# Patient Record
Sex: MALE | Race: WHITE | NOT HISPANIC OR LATINO | URBAN - METROPOLITAN AREA
[De-identification: names, ages, dates, MRNs, and addresses within clinical notes are randomized per-mention and may not be internally consistent; named-entity substitution may affect disease eponyms.]

---

## 2017-02-12 ENCOUNTER — EMERGENCY (EMERGENCY)
Facility: HOSPITAL | Age: 82
LOS: 1 days | End: 2017-02-12
Admitting: INTERNAL MEDICINE
Payer: MEDICARE

## 2017-02-12 DIAGNOSIS — Z95.5 PRESENCE OF CORONARY ANGIOPLASTY IMPLANT AND GRAFT: Chronic | ICD-10-CM

## 2017-02-12 DIAGNOSIS — Z95.1 PRESENCE OF AORTOCORONARY BYPASS GRAFT: Chronic | ICD-10-CM

## 2017-02-12 DIAGNOSIS — Z98.89 OTHER SPECIFIED POSTPROCEDURAL STATES: Chronic | ICD-10-CM

## 2017-02-12 PROCEDURE — 70450 CT HEAD/BRAIN W/O DYE: CPT | Mod: 26

## 2017-02-12 PROCEDURE — 70450 CT HEAD/BRAIN W/O DYE: CPT

## 2017-02-12 PROCEDURE — 99284 EMERGENCY DEPT VISIT MOD MDM: CPT | Mod: 25

## 2017-02-12 PROCEDURE — 99284 EMERGENCY DEPT VISIT MOD MDM: CPT

## 2017-08-14 ENCOUNTER — INPATIENT (INPATIENT)
Facility: HOSPITAL | Age: 82
LOS: 3 days | DRG: 189 | End: 2017-08-18
Attending: INTERNAL MEDICINE | Admitting: INTERNAL MEDICINE
Payer: MEDICARE

## 2017-08-14 VITALS — HEART RATE: 88 BPM | RESPIRATION RATE: 20 BRPM | SYSTOLIC BLOOD PRESSURE: 112 MMHG | DIASTOLIC BLOOD PRESSURE: 74 MMHG

## 2017-08-14 DIAGNOSIS — Z71.89 OTHER SPECIFIED COUNSELING: ICD-10-CM

## 2017-08-14 DIAGNOSIS — N39.0 URINARY TRACT INFECTION, SITE NOT SPECIFIED: ICD-10-CM

## 2017-08-14 DIAGNOSIS — Z95.5 PRESENCE OF CORONARY ANGIOPLASTY IMPLANT AND GRAFT: ICD-10-CM

## 2017-08-14 DIAGNOSIS — N17.9 ACUTE KIDNEY FAILURE, UNSPECIFIED: ICD-10-CM

## 2017-08-14 DIAGNOSIS — R06.00 DYSPNEA, UNSPECIFIED: ICD-10-CM

## 2017-08-14 DIAGNOSIS — R11.2 NAUSEA WITH VOMITING, UNSPECIFIED: ICD-10-CM

## 2017-08-14 DIAGNOSIS — I25.10 ATHEROSCLEROTIC HEART DISEASE OF NATIVE CORONARY ARTERY WITHOUT ANGINA PECTORIS: ICD-10-CM

## 2017-08-14 DIAGNOSIS — Z98.89 OTHER SPECIFIED POSTPROCEDURAL STATES: Chronic | ICD-10-CM

## 2017-08-14 DIAGNOSIS — I10 ESSENTIAL (PRIMARY) HYPERTENSION: ICD-10-CM

## 2017-08-14 DIAGNOSIS — K21.9 GASTRO-ESOPHAGEAL REFLUX DISEASE WITHOUT ESOPHAGITIS: ICD-10-CM

## 2017-08-14 DIAGNOSIS — R11.10 VOMITING, UNSPECIFIED: ICD-10-CM

## 2017-08-14 DIAGNOSIS — Z95.1 PRESENCE OF AORTOCORONARY BYPASS GRAFT: Chronic | ICD-10-CM

## 2017-08-14 DIAGNOSIS — F32.9 MAJOR DEPRESSIVE DISORDER, SINGLE EPISODE, UNSPECIFIED: ICD-10-CM

## 2017-08-14 DIAGNOSIS — Z95.5 PRESENCE OF CORONARY ANGIOPLASTY IMPLANT AND GRAFT: Chronic | ICD-10-CM

## 2017-08-14 DIAGNOSIS — E78.5 HYPERLIPIDEMIA, UNSPECIFIED: ICD-10-CM

## 2017-08-14 DIAGNOSIS — A41.9 SEPSIS, UNSPECIFIED ORGANISM: ICD-10-CM

## 2017-08-14 LAB
ALBUMIN SERPL ELPH-MCNC: 2.8 G/DL — LOW (ref 3.3–5)
ALP SERPL-CCNC: 49 U/L — SIGNIFICANT CHANGE UP (ref 40–120)
ALT FLD-CCNC: 16 U/L — SIGNIFICANT CHANGE UP (ref 12–78)
ANION GAP SERPL CALC-SCNC: 11 MMOL/L — SIGNIFICANT CHANGE UP (ref 5–17)
APTT BLD: 25.1 SEC — LOW (ref 27.5–37.4)
AST SERPL-CCNC: 21 U/L — SIGNIFICANT CHANGE UP (ref 15–37)
BASE EXCESS BLDA CALC-SCNC: -3.4 MMOL/L — LOW (ref -2–2)
BASOPHILS # BLD AUTO: 0.1 K/UL — SIGNIFICANT CHANGE UP (ref 0–0.2)
BASOPHILS NFR BLD AUTO: 0.9 % — SIGNIFICANT CHANGE UP (ref 0–2)
BILIRUB SERPL-MCNC: 0.4 MG/DL — SIGNIFICANT CHANGE UP (ref 0.2–1.2)
BLOOD GAS COMMENTS ARTERIAL: SIGNIFICANT CHANGE UP
BUN SERPL-MCNC: 42 MG/DL — HIGH (ref 7–23)
CALCIUM SERPL-MCNC: 8.1 MG/DL — LOW (ref 8.5–10.1)
CHLORIDE SERPL-SCNC: 109 MMOL/L — HIGH (ref 96–108)
CO2 SERPL-SCNC: 23 MMOL/L — SIGNIFICANT CHANGE UP (ref 22–31)
CREAT SERPL-MCNC: 1.8 MG/DL — HIGH (ref 0.5–1.3)
EOSINOPHIL # BLD AUTO: 0.1 K/UL — SIGNIFICANT CHANGE UP (ref 0–0.5)
EOSINOPHIL NFR BLD AUTO: 0.6 % — SIGNIFICANT CHANGE UP (ref 0–6)
GLUCOSE SERPL-MCNC: 157 MG/DL — HIGH (ref 70–99)
HCO3 BLDA-SCNC: 22 MMOL/L — LOW (ref 23–27)
HCT VFR BLD CALC: 34 % — LOW (ref 39–50)
HGB BLD-MCNC: 11.1 G/DL — LOW (ref 13–17)
HOROWITZ INDEX BLDA+IHG-RTO: 36 — SIGNIFICANT CHANGE UP
INR BLD: 1.21 RATIO — HIGH (ref 0.88–1.16)
LACTATE SERPL-SCNC: 2.9 MMOL/L — HIGH (ref 0.7–2)
LACTATE SERPL-SCNC: 3 MMOL/L — HIGH (ref 0.7–2)
LYMPHOCYTES # BLD AUTO: 2.8 K/UL — SIGNIFICANT CHANGE UP (ref 1–3.3)
LYMPHOCYTES # BLD AUTO: 21.1 % — SIGNIFICANT CHANGE UP (ref 13–44)
MCHC RBC-ENTMCNC: 29.5 PG — SIGNIFICANT CHANGE UP (ref 27–34)
MCHC RBC-ENTMCNC: 32.6 GM/DL — SIGNIFICANT CHANGE UP (ref 32–36)
MCV RBC AUTO: 90.6 FL — SIGNIFICANT CHANGE UP (ref 80–100)
MONOCYTES # BLD AUTO: 0.6 K/UL — SIGNIFICANT CHANGE UP (ref 0–0.9)
MONOCYTES NFR BLD AUTO: 4.5 % — SIGNIFICANT CHANGE UP (ref 1–9)
NEUTROPHILS # BLD AUTO: 9.5 K/UL — HIGH (ref 1.8–7.4)
NEUTROPHILS NFR BLD AUTO: 72.9 % — SIGNIFICANT CHANGE UP (ref 43–77)
NT-PROBNP SERPL-SCNC: 666 PG/ML — HIGH (ref 0–450)
PCO2 BLDA: 36 MMHG — SIGNIFICANT CHANGE UP (ref 32–46)
PH BLDA: 7.38 — SIGNIFICANT CHANGE UP (ref 7.35–7.45)
PLATELET # BLD AUTO: 227 K/UL — SIGNIFICANT CHANGE UP (ref 150–400)
PO2 BLDA: 54 MMHG — LOW (ref 74–108)
POTASSIUM SERPL-MCNC: 3.9 MMOL/L — SIGNIFICANT CHANGE UP (ref 3.5–5.3)
POTASSIUM SERPL-SCNC: 3.9 MMOL/L — SIGNIFICANT CHANGE UP (ref 3.5–5.3)
PROT SERPL-MCNC: 6.4 G/DL — SIGNIFICANT CHANGE UP (ref 6–8.3)
PROTHROM AB SERPL-ACNC: 13.2 SEC — HIGH (ref 9.8–12.7)
RBC # BLD: 3.75 M/UL — LOW (ref 4.2–5.8)
RBC # FLD: 16.1 % — HIGH (ref 10.3–14.5)
SAO2 % BLDA: 85 % — LOW (ref 92–96)
SODIUM SERPL-SCNC: 143 MMOL/L — SIGNIFICANT CHANGE UP (ref 135–145)
TROPONIN I SERPL-MCNC: 0.02 NG/ML — SIGNIFICANT CHANGE UP (ref 0.01–0.04)
WBC # BLD: 13.1 K/UL — HIGH (ref 3.8–10.5)
WBC # FLD AUTO: 13.1 K/UL — HIGH (ref 3.8–10.5)

## 2017-08-14 PROCEDURE — 93010 ELECTROCARDIOGRAM REPORT: CPT

## 2017-08-14 PROCEDURE — 78582 LUNG VENTILAT&PERFUS IMAGING: CPT | Mod: 26

## 2017-08-14 PROCEDURE — 99285 EMERGENCY DEPT VISIT HI MDM: CPT

## 2017-08-14 PROCEDURE — 71010: CPT | Mod: 26

## 2017-08-14 PROCEDURE — 74020: CPT | Mod: 26

## 2017-08-14 RX ORDER — MEGESTROL ACETATE 40 MG/ML
625 SUSPENSION ORAL AT BEDTIME
Qty: 0 | Refills: 0 | Status: DISCONTINUED | OUTPATIENT
Start: 2017-08-14 | End: 2017-08-14

## 2017-08-14 RX ORDER — DOCUSATE SODIUM 100 MG
100 CAPSULE ORAL
Qty: 0 | Refills: 0 | Status: DISCONTINUED | OUTPATIENT
Start: 2017-08-14 | End: 2017-08-14

## 2017-08-14 RX ORDER — MORPHINE SULFATE 50 MG/1
2 CAPSULE, EXTENDED RELEASE ORAL
Qty: 0 | Refills: 0 | Status: DISCONTINUED | OUTPATIENT
Start: 2017-08-14 | End: 2017-08-18

## 2017-08-14 RX ORDER — CEFTRIAXONE 500 MG/1
INJECTION, POWDER, FOR SOLUTION INTRAMUSCULAR; INTRAVENOUS
Qty: 0 | Refills: 0 | Status: DISCONTINUED | OUTPATIENT
Start: 2017-08-14 | End: 2017-08-14

## 2017-08-14 RX ORDER — HYDROMORPHONE HYDROCHLORIDE 2 MG/ML
1 INJECTION INTRAMUSCULAR; INTRAVENOUS; SUBCUTANEOUS
Qty: 0 | Refills: 0 | Status: DISCONTINUED | OUTPATIENT
Start: 2017-08-14 | End: 2017-08-14

## 2017-08-14 RX ORDER — SODIUM CHLORIDE 9 MG/ML
3 INJECTION INTRAMUSCULAR; INTRAVENOUS; SUBCUTANEOUS ONCE
Qty: 0 | Refills: 0 | Status: COMPLETED | OUTPATIENT
Start: 2017-08-14 | End: 2017-08-14

## 2017-08-14 RX ORDER — MORPHINE SULFATE 50 MG/1
2 CAPSULE, EXTENDED RELEASE ORAL ONCE
Qty: 0 | Refills: 0 | Status: DISCONTINUED | OUTPATIENT
Start: 2017-08-14 | End: 2017-08-14

## 2017-08-14 RX ORDER — PANTOPRAZOLE SODIUM 20 MG/1
40 TABLET, DELAYED RELEASE ORAL
Qty: 0 | Refills: 0 | Status: DISCONTINUED | OUTPATIENT
Start: 2017-08-14 | End: 2017-08-14

## 2017-08-14 RX ORDER — METOPROLOL TARTRATE 50 MG
25 TABLET ORAL DAILY
Qty: 0 | Refills: 0 | Status: DISCONTINUED | OUTPATIENT
Start: 2017-08-14 | End: 2017-08-14

## 2017-08-14 RX ORDER — SODIUM CHLORIDE 9 MG/ML
1000 INJECTION INTRAMUSCULAR; INTRAVENOUS; SUBCUTANEOUS ONCE
Qty: 0 | Refills: 0 | Status: COMPLETED | OUTPATIENT
Start: 2017-08-14 | End: 2017-08-14

## 2017-08-14 RX ORDER — FINASTERIDE 5 MG/1
5 TABLET, FILM COATED ORAL DAILY
Qty: 0 | Refills: 0 | Status: DISCONTINUED | OUTPATIENT
Start: 2017-08-14 | End: 2017-08-14

## 2017-08-14 RX ORDER — TICAGRELOR 90 MG/1
90 TABLET ORAL
Qty: 0 | Refills: 0 | Status: DISCONTINUED | OUTPATIENT
Start: 2017-08-14 | End: 2017-08-14

## 2017-08-14 RX ORDER — MEGESTROL ACETATE 40 MG/ML
800 SUSPENSION ORAL AT BEDTIME
Qty: 0 | Refills: 0 | Status: DISCONTINUED | OUTPATIENT
Start: 2017-08-14 | End: 2017-08-14

## 2017-08-14 RX ORDER — TAMSULOSIN HYDROCHLORIDE 0.4 MG/1
0.8 CAPSULE ORAL AT BEDTIME
Qty: 0 | Refills: 0 | Status: DISCONTINUED | OUTPATIENT
Start: 2017-08-14 | End: 2017-08-14

## 2017-08-14 RX ORDER — SODIUM CHLORIDE 9 MG/ML
1000 INJECTION INTRAMUSCULAR; INTRAVENOUS; SUBCUTANEOUS ONCE
Qty: 0 | Refills: 0 | Status: DISCONTINUED | OUTPATIENT
Start: 2017-08-14 | End: 2017-08-14

## 2017-08-14 RX ORDER — ACETAMINOPHEN 500 MG
650 TABLET ORAL EVERY 6 HOURS
Qty: 0 | Refills: 0 | Status: DISCONTINUED | OUTPATIENT
Start: 2017-08-14 | End: 2017-08-18

## 2017-08-14 RX ORDER — PREGABALIN 225 MG/1
1000 CAPSULE ORAL DAILY
Qty: 0 | Refills: 0 | Status: DISCONTINUED | OUTPATIENT
Start: 2017-08-14 | End: 2017-08-14

## 2017-08-14 RX ORDER — CEFTRIAXONE 500 MG/1
1 INJECTION, POWDER, FOR SOLUTION INTRAMUSCULAR; INTRAVENOUS ONCE
Qty: 0 | Refills: 0 | Status: COMPLETED | OUTPATIENT
Start: 2017-08-14 | End: 2017-08-14

## 2017-08-14 RX ORDER — FLUOXETINE HCL 10 MG
60 CAPSULE ORAL AT BEDTIME
Qty: 0 | Refills: 0 | Status: DISCONTINUED | OUTPATIENT
Start: 2017-08-14 | End: 2017-08-14

## 2017-08-14 RX ORDER — ATROPINE SULFATE 1 %
4 DROPS OPHTHALMIC (EYE) EVERY 6 HOURS
Qty: 0 | Refills: 0 | Status: DISCONTINUED | OUTPATIENT
Start: 2017-08-14 | End: 2017-08-18

## 2017-08-14 RX ORDER — ASPIRIN/CALCIUM CARB/MAGNESIUM 324 MG
81 TABLET ORAL DAILY
Qty: 0 | Refills: 0 | Status: DISCONTINUED | OUTPATIENT
Start: 2017-08-14 | End: 2017-08-14

## 2017-08-14 RX ORDER — SUCRALFATE 1 G
1 TABLET ORAL EVERY 6 HOURS
Qty: 0 | Refills: 0 | Status: DISCONTINUED | OUTPATIENT
Start: 2017-08-14 | End: 2017-08-14

## 2017-08-14 RX ORDER — ONDANSETRON 8 MG/1
4 TABLET, FILM COATED ORAL EVERY 6 HOURS
Qty: 0 | Refills: 0 | Status: DISCONTINUED | OUTPATIENT
Start: 2017-08-14 | End: 2017-08-18

## 2017-08-14 RX ORDER — SODIUM CHLORIDE 9 MG/ML
1000 INJECTION INTRAMUSCULAR; INTRAVENOUS; SUBCUTANEOUS
Qty: 0 | Refills: 0 | Status: DISCONTINUED | OUTPATIENT
Start: 2017-08-14 | End: 2017-08-15

## 2017-08-14 RX ORDER — ALBUTEROL 90 UG/1
2.5 AEROSOL, METERED ORAL ONCE
Qty: 0 | Refills: 0 | Status: DISCONTINUED | OUTPATIENT
Start: 2017-08-14 | End: 2017-08-14

## 2017-08-14 RX ORDER — ATORVASTATIN CALCIUM 80 MG/1
40 TABLET, FILM COATED ORAL AT BEDTIME
Qty: 0 | Refills: 0 | Status: DISCONTINUED | OUTPATIENT
Start: 2017-08-14 | End: 2017-08-14

## 2017-08-14 RX ADMIN — CEFTRIAXONE 100 GRAM(S): 500 INJECTION, POWDER, FOR SOLUTION INTRAMUSCULAR; INTRAVENOUS at 14:47

## 2017-08-14 RX ADMIN — SODIUM CHLORIDE 1000 MILLILITER(S): 9 INJECTION INTRAMUSCULAR; INTRAVENOUS; SUBCUTANEOUS at 10:25

## 2017-08-14 RX ADMIN — MORPHINE SULFATE 2 MILLIGRAM(S): 50 CAPSULE, EXTENDED RELEASE ORAL at 20:24

## 2017-08-14 RX ADMIN — SODIUM CHLORIDE 60 MILLILITER(S): 9 INJECTION INTRAMUSCULAR; INTRAVENOUS; SUBCUTANEOUS at 23:28

## 2017-08-14 RX ADMIN — SODIUM CHLORIDE 2000 MILLILITER(S): 9 INJECTION INTRAMUSCULAR; INTRAVENOUS; SUBCUTANEOUS at 11:30

## 2017-08-14 RX ADMIN — SODIUM CHLORIDE 3 MILLILITER(S): 9 INJECTION INTRAMUSCULAR; INTRAVENOUS; SUBCUTANEOUS at 09:25

## 2017-08-14 RX ADMIN — MORPHINE SULFATE 2 MILLIGRAM(S): 50 CAPSULE, EXTENDED RELEASE ORAL at 19:45

## 2017-08-14 RX ADMIN — MORPHINE SULFATE 2 MILLIGRAM(S): 50 CAPSULE, EXTENDED RELEASE ORAL at 23:28

## 2017-08-14 RX ADMIN — MORPHINE SULFATE 2 MILLIGRAM(S): 50 CAPSULE, EXTENDED RELEASE ORAL at 16:19

## 2017-08-14 RX ADMIN — HYDROMORPHONE HYDROCHLORIDE 1 MILLIGRAM(S): 2 INJECTION INTRAMUSCULAR; INTRAVENOUS; SUBCUTANEOUS at 16:33

## 2017-08-14 NOTE — CHART NOTE - NSCHARTNOTEFT_GEN_A_CORE
Patient is a 88y old  Male who presents with a chief complaint of vomitting and unresponsiveness (14 Aug 2017 13:13)      Rapid response was called on this 87yo  Male patient for dyspnea.  Pt was seen and examined in ED with difficulty breathing.  Pt appeared confused and unable to find a position of comfort with breathing.  Pt was put on venturi mask 100% oxygen.  ICU team was present and Dr. Echevarria spoke with daughter who is healthcare proxy and she has agreed for comfort care.  Pt was given 2mg Morphine IV for dyspnea.  Symptoms mildly improved.    Patient was seen and examined at the bedside by the rapid response team.    ROS unable to provide due to dyspnea and incomplete responses.    PAST MEDICAL & SURGICAL HISTORY:  Stented coronary artery: x 3, most recent stent 6 months ago  AAA (abdominal aortic aneurysm)  GERD (gastroesophageal reflux disease)  HLD (hyperlipidemia)  HTN (hypertension)  CAD (coronary artery disease)  History of heart artery stent      MEDICATIONS  (STANDING):  finasteride 5 milliGRAM(s) Oral daily  aspirin enteric coated 81 milliGRAM(s) Oral daily  tamsulosin 0.8 milliGRAM(s) Oral at bedtime  FLUoxetine 60 milliGRAM(s) Oral at bedtime  atorvastatin 40 milliGRAM(s) Oral at bedtime  ticagrelor 90 milliGRAM(s) Oral two times a day  metoprolol succinate ER 25 milliGRAM(s) Oral daily  docusate sodium 100 milliGRAM(s) Oral two times a day  pantoprazole    Tablet 40 milliGRAM(s) Oral before breakfast  cyanocobalamin 1000 MICROGram(s) Oral daily  sodium chloride 0.9%. 1000 milliLiter(s) (60 mL/Hr) IV Continuous <Continuous>  cefTRIAXone   IVPB   IV Intermittent   megestrol Suspension 800 milliGRAM(s) Oral at bedtime  ALBUTerol    0.083%. 2.5 milliGRAM(s) Nebulizer once  sucralfate 1 Gram(s) Oral every 6 hours  morphine  - Injectable 2 milliGRAM(s) IV Push once    MEDICATIONS  (PRN):  acetaminophen   Tablet 650 milliGRAM(s) Oral every 6 hours PRN Mild Pain  ondansetron Injectable 4 milliGRAM(s) IV Push every 6 hours PRN Nausea and/or Vomiting  HYDROmorphone  Injectable 1 milliGRAM(s) IV Push every 2 hours PRN SOB      Allergies    No Known Allergies    Intolerances        Vital Signs Last 24 Hrs  BP: 132/60 HR: 127 RR: 48 Temp 97.3 SPO2: 84%    PHYSICAL EXAM:  GENERAL: Pt in severe respiratory distress, agitated, confused  HEAD:  Atraumatic, Normocephalic  EYES: PERRLA, conjunctiva and sclera clear  NECK: Supple, No JVD  NERVOUS SYSTEM:  Awake, Alert & Oriented X1 to person, moving all extremities  CHEST/LUNG: coarse breath sounds throughout  HEART: Tachycardic, S1 S2   ABDOMEN: Soft, Distended, nontender; Bowel sounds present  EXTREMITIES:  2+ Peripheral Pulses, No clubbing, cyanosis, or edema, right knee abrasion  LYMPH: No lymphadenopathy noted  SKIN: No rashes or lesions    LABS:                        11.1   13.1  )-----------( 227      ( 14 Aug 2017 09:46 )             34.0     14 Aug 2017 09:46    143    |  109    |  42     ----------------------------<  157    3.9     |  23     |  1.80     Ca    8.1        14 Aug 2017 09:46    TPro  6.4    /  Alb  2.8    /  TBili  0.4    /  DBili  x      /  AST  21     /  ALT  16     /  AlkPhos  49     14 Aug 2017 09:46    PT/INR - ( 14 Aug 2017 09:46 )   PT: 13.2 sec;   INR: 1.21 ratio         PTT - ( 14 Aug 2017 09:46 )  PTT:25.1 sec    CAPILLARY BLOOD GLUCOSE        ABG - ( 14 Aug 2017 09:52 )  pH: 7.38  /  pCO2: 36    /  pO2: 54    / HCO3: 22    / Base Excess: -3.4  /  SaO2: 85          ASSESSMENT/PLAN  87yo M with PMHx of CAD s/p cardiac stents, HTN, HLD presents for acute dyspnea.    Dyspnea  -100% O2 on venturi mask  -Morphine 2mg IV STAT x1 dose   -Dilaudid 1mg IV q2 PRN  -Monitor SPO2  -ICU Attending Dr. Echevarria notified PMD Dr. Segal  -ICU Attending Dr. Echevarria spoke with family and they agree with comfort care'  -Pt is DNR                RADIOLOGY & ADDITIONAL TESTS:    Imaging Personally Reviewed:  [ ] YES  [ ] NO    Consultant(s) Notes Reviewed:  [ ] YES  [ ] NO    Care Discussed with Consultants/Other Providers [ ] YES  [ ] NO    Critical care time spent at bedside and coordinating care for patient: Patient is a 88y old  Male who presents with a chief complaint of vomitting and unresponsiveness (14 Aug 2017 13:13)      Rapid response was called on this 87yo  Male patient for dyspnea.  Pt was seen and examined in ED with difficulty breathing.  Pt appeared confused and unable to find a position of comfort with breathing.  Pt was put on venturi mask 100% oxygen.  ICU team was present and Dr. Echevarria spoke with daughter who is healthcare proxy and she has agreed for comfort care.  Pt was given 2mg Morphine IV for dyspnea.  Symptoms mildly improved.    Patient was seen and examined at the bedside by the rapid response team.    ROS unable to provide due to dyspnea and incomplete responses.    PAST MEDICAL & SURGICAL HISTORY:  Stented coronary artery: x 3, most recent stent 6 months ago  AAA (abdominal aortic aneurysm)  GERD (gastroesophageal reflux disease)  HLD (hyperlipidemia)  HTN (hypertension)  CAD (coronary artery disease)  History of heart artery stent      MEDICATIONS  (STANDING):  finasteride 5 milliGRAM(s) Oral daily  aspirin enteric coated 81 milliGRAM(s) Oral daily  tamsulosin 0.8 milliGRAM(s) Oral at bedtime  FLUoxetine 60 milliGRAM(s) Oral at bedtime  atorvastatin 40 milliGRAM(s) Oral at bedtime  ticagrelor 90 milliGRAM(s) Oral two times a day  metoprolol succinate ER 25 milliGRAM(s) Oral daily  docusate sodium 100 milliGRAM(s) Oral two times a day  pantoprazole    Tablet 40 milliGRAM(s) Oral before breakfast  cyanocobalamin 1000 MICROGram(s) Oral daily  sodium chloride 0.9%. 1000 milliLiter(s) (60 mL/Hr) IV Continuous <Continuous>  cefTRIAXone   IVPB   IV Intermittent   megestrol Suspension 800 milliGRAM(s) Oral at bedtime  ALBUTerol    0.083%. 2.5 milliGRAM(s) Nebulizer once  sucralfate 1 Gram(s) Oral every 6 hours  morphine  - Injectable 2 milliGRAM(s) IV Push once    MEDICATIONS  (PRN):  acetaminophen   Tablet 650 milliGRAM(s) Oral every 6 hours PRN Mild Pain  ondansetron Injectable 4 milliGRAM(s) IV Push every 6 hours PRN Nausea and/or Vomiting  HYDROmorphone  Injectable 1 milliGRAM(s) IV Push every 2 hours PRN SOB      Allergies    No Known Allergies    Intolerances        Vital Signs Last 24 Hrs  BP: 132/60 HR: 127 RR: 48 Temp 97.3 SPO2: 84%    PHYSICAL EXAM:  GENERAL: Pt in severe respiratory distress, agitated, confused  HEAD:  Atraumatic, Normocephalic  EYES: PERRLA, conjunctiva and sclera clear  NECK: Supple, No JVD  NERVOUS SYSTEM:  Awake, Alert & Oriented X1 to person, moving all extremities  CHEST/LUNG: coarse breath sounds throughout  HEART: Tachycardic, S1 S2   ABDOMEN: Soft, Distended, nontender; Bowel sounds present  EXTREMITIES:  2+ Peripheral Pulses, No clubbing, cyanosis, or edema, right knee abrasion  LYMPH: No lymphadenopathy noted  SKIN: No rashes or lesions    LABS:                        11.1   13.1  )-----------( 227      ( 14 Aug 2017 09:46 )             34.0     14 Aug 2017 09:46    143    |  109    |  42     ----------------------------<  157    3.9     |  23     |  1.80     Ca    8.1        14 Aug 2017 09:46    TPro  6.4    /  Alb  2.8    /  TBili  0.4    /  DBili  x      /  AST  21     /  ALT  16     /  AlkPhos  49     14 Aug 2017 09:46    PT/INR - ( 14 Aug 2017 09:46 )   PT: 13.2 sec;   INR: 1.21 ratio         PTT - ( 14 Aug 2017 09:46 )  PTT:25.1 sec    CAPILLARY BLOOD GLUCOSE        ABG - ( 14 Aug 2017 09:52 )  pH: 7.38  /  pCO2: 36    /  pO2: 54    / HCO3: 22    / Base Excess: -3.4  /  SaO2: 85          ASSESSMENT/PLAN  87yo M with PMHx of CAD s/p cardiac stents, HTN, HLD presents for acute dyspnea.  Clinically, patient appears to be in respiratory failure.    Dyspnea  -100% O2 on venturi mask  -Morphine 2mg IV STAT x1 dose   -Dilaudid 1mg IV q2 PRN  -Monitor SPO2  -ICU Attending Dr. Echevarria notified PMD Dr. Segal  -ICU Attending Dr. Echevarria spoke with family and they agree with comfort care'  -Pt is DNR                RADIOLOGY & ADDITIONAL TESTS:    Imaging Personally Reviewed:  [ ] YES  [ ] NO    Consultant(s) Notes Reviewed:  [ ] YES  [ ] NO    Care Discussed with Consultants/Other Providers [ ] YES  [ ] NO    Critical care time spent at bedside and coordinating care for patient:

## 2017-08-14 NOTE — CONSULT NOTE ADULT - PROBLEM SELECTOR RECOMMENDATION 9
D/C all previous medication orders except tylenol and zofran  D/C MEWS  D/C remote tele  100% NRB  Morphine 2mg ivp q2h prn for pain/discomfort/dyspnea/breathlessness  Ativan 1mg ivp q6h prn for agitation/anxiety  Atropine 4gtt SL q6h prn for terminal secretions  DNR/DNI in place

## 2017-08-14 NOTE — ED PROVIDER NOTE - CONSTITUTIONAL, MLM
normal... Pale elderly white male, well nourished, awake, slightly lethargic, oriented to person, place, in mild apparent distress.

## 2017-08-14 NOTE — CONSULT NOTE ADULT - SUBJECTIVE AND OBJECTIVE BOX
Chief Complaint:  Patient is a 88y old  Male who presents with a chief complaint of vomitting and unresponsiveness (14 Aug 2017 13:13)      HPI: This is an 88 year old male from [facility] in the area, who presents to the emergency room after 1 episode of vomiting.  On arrival to the ER patient appears dyspneic with O2 sat of 94% on room air though patient denies any SOB. Admits to slight cough but No CP/AP/Back Pain. ABG showing hypoxia, to have VQ scan. As per daughter who is his hcp, pt has been deteriorating for the past one year. Lives at assisted living facility and has a 24 hr aid. Pt admits mild epigastric pain. Pt denies diarrhea , constipation, hematemesis, hematochezia.    Allergies:  No Known Allergies      Medications:  finasteride 5 milliGRAM(s) Oral daily  acetaminophen   Tablet 650 milliGRAM(s) Oral every 6 hours PRN  aspirin enteric coated 81 milliGRAM(s) Oral daily  tamsulosin 0.8 milliGRAM(s) Oral at bedtime  FLUoxetine 60 milliGRAM(s) Oral at bedtime  atorvastatin 40 milliGRAM(s) Oral at bedtime  ticagrelor 90 milliGRAM(s) Oral two times a day  metoprolol succinate ER 25 milliGRAM(s) Oral daily  docusate sodium 100 milliGRAM(s) Oral two times a day  pantoprazole    Tablet 40 milliGRAM(s) Oral before breakfast  cyanocobalamin 1000 MICROGram(s) Oral daily  sodium chloride 0.9%. 1000 milliLiter(s) IV Continuous <Continuous>  cefTRIAXone   IVPB   IV Intermittent   ondansetron Injectable 4 milliGRAM(s) IV Push every 6 hours PRN  megestrol Suspension 800 milliGRAM(s) Oral at bedtime  ALBUTerol    0.083%. 2.5 milliGRAM(s) Nebulizer once      PMHX/PSHX:  Stented coronary artery  AAA (abdominal aortic aneurysm)  GERD (gastroesophageal reflux disease)  HLD (hyperlipidemia)  HTN (hypertension)  CAD (coronary artery disease)  History of heart artery stent      Family history:  No pertinent family history in first degree relatives      Social History: no etoh or tobacco use    ROS:     General:  No wt loss, fevers, chills, night sweats, fatigue,   Eyes:  Good vision, no reported pain  ENT:  No sore throat, pain, runny nose, dysphagia  CV:  No pain, palpitations, hypo/hypertension  Resp:  No dyspnea, cough, tachypnea, wheezing  GI:  +nausea/vomiting, mild epigastric pain   :  No pain, bleeding, incontinence, nocturia  Muscle:  No pain, weakness  Neuro:  No weakness, tingling, memory problems  Psych:  No fatigue, insomnia, mood problems, depression  Endocrine:  No polyuria, polydipsia, cold/heat intolerance  Heme:  No petechiae, ecchymosis, easy bruisability  Skin:  No rash, tattoos, scars, edema      PHYSICAL EXAM:   Vital Signs:  Vital Signs Last 24 Hrs  T(C): 37.1 (14 Aug 2017 09:15), Max: 37.1 (14 Aug 2017 09:15)  T(F): 98.8 (14 Aug 2017 09:15), Max: 98.8 (14 Aug 2017 09:15)  HR: 64 (14 Aug 2017 15:29) (64 - 88)  BP: 94/46 (14 Aug 2017 15:29) (94/44 - 140/100)  BP(mean): --  RR: 20 (14 Aug 2017 15:29) (18 - 28)  SpO2: 93% (14 Aug 2017 15:29) (92% - 94%)  Daily     Daily     GENERAL:  Appears stated age, well-groomed, well-nourished, no distress  HEENT:  NC/AT,  conjunctivae clear and pink, no thyromegaly, nodules, adenopathy, no JVD, sclera -anicteric  CHEST:  Full & symmetric excursion, no increased effort, breath sounds clear  HEART:  Regular rhythm, S1, S2, no murmur/rub/S3/S4, no abdominal bruit, no edema  ABDOMEN:  Soft, non-tender, non-distended, normoactive bowel sounds,  no masses ,no hepato-splenomegaly, no signs of chronic liver disease  EXTREMITIES  no cyanosis,clubbing or edema  SKIN:  No rash/erythema/ecchymoses/petechiae/wounds/abscess/warm/dry  NEURO:  AAO x 1-2, no asterixis, no tremor, no encephalopathy    LABS:                        11.1   13.1  )-----------( 227      ( 14 Aug 2017 09:46 )             34.0     08-14    143  |  109<H>  |  42<H>  ----------------------------<  157<H>  3.9   |  23  |  1.80<H>    Ca    8.1<L>      14 Aug 2017 09:46    TPro  6.4  /  Alb  2.8<L>  /  TBili  0.4  /  DBili  x   /  AST  21  /  ALT  16  /  AlkPhos  49  08-14    LIVER FUNCTIONS - ( 14 Aug 2017 09:46 )  Alb: 2.8 g/dL / Pro: 6.4 g/dL / ALK PHOS: 49 U/L / ALT: 16 U/L / AST: 21 U/L / GGT: x           PT/INR - ( 14 Aug 2017 09:46 )   PT: 13.2 sec;   INR: 1.21 ratio         PTT - ( 14 Aug 2017 09:46 )  PTT:25.1 sec        Imaging: Chief Complaint:  Patient is a 88y old  Male who presents with a chief complaint of vomitting and unresponsiveness (14 Aug 2017 13:13)      HPI: This is an 88 year old male from [facility] in the area, who presents to the emergency room after 1 episode of vomiting.  On arrival to the ER patient appears dyspneic with O2 sat of 94% on room air though patient denies any SOB. Admits to slight cough but No CP/AP/Back Pain. ABG showing hypoxia, to have VQ scan. As per daughter who is his hcp, pt has been deteriorating for the past one year. Lives at assisted living facility and has a 24 hr aid. Pt admits mild epigastric pain. Pt denies diarrhea , constipation, hematemesis, hematochezia.    Allergies:  No Known Allergies      Medications:  finasteride 5 milliGRAM(s) Oral daily  acetaminophen   Tablet 650 milliGRAM(s) Oral every 6 hours PRN  aspirin enteric coated 81 milliGRAM(s) Oral daily  tamsulosin 0.8 milliGRAM(s) Oral at bedtime  FLUoxetine 60 milliGRAM(s) Oral at bedtime  atorvastatin 40 milliGRAM(s) Oral at bedtime  ticagrelor 90 milliGRAM(s) Oral two times a day  metoprolol succinate ER 25 milliGRAM(s) Oral daily  docusate sodium 100 milliGRAM(s) Oral two times a day  pantoprazole    Tablet 40 milliGRAM(s) Oral before breakfast  cyanocobalamin 1000 MICROGram(s) Oral daily  sodium chloride 0.9%. 1000 milliLiter(s) IV Continuous <Continuous>  cefTRIAXone   IVPB   IV Intermittent   ondansetron Injectable 4 milliGRAM(s) IV Push every 6 hours PRN  megestrol Suspension 800 milliGRAM(s) Oral at bedtime  ALBUTerol    0.083%. 2.5 milliGRAM(s) Nebulizer once      PMHX/PSHX:  Stented coronary artery  AAA (abdominal aortic aneurysm)  GERD (gastroesophageal reflux disease)  HLD (hyperlipidemia)  HTN (hypertension)  CAD (coronary artery disease)  History of heart artery stent      Family history:  No pertinent family history in first degree relatives      Social History: no etoh or tobacco use    ROS:     General:  No wt loss, fevers, chills, night sweats, fatigue,   Eyes:  Good vision, no reported pain  ENT:  No sore throat, pain, runny nose, dysphagia  CV:  No pain, palpitations, hypo/hypertension  Resp:  No dyspnea, cough, tachypnea, wheezing  GI:  +nausea/vomiting, mild epigastric pain   :  No pain, bleeding, incontinence, nocturia  Muscle:  No pain, weakness  Neuro:  No weakness, tingling, memory problems  Psych:  No fatigue, insomnia, mood problems, depression  Endocrine:  No polyuria, polydipsia, cold/heat intolerance  Heme:  No petechiae, ecchymosis, easy bruisability  Skin:  No rash, tattoos, scars, edema      PHYSICAL EXAM:   Vital Signs:  Vital Signs Last 24 Hrs  T(C): 37.1 (14 Aug 2017 09:15), Max: 37.1 (14 Aug 2017 09:15)  T(F): 98.8 (14 Aug 2017 09:15), Max: 98.8 (14 Aug 2017 09:15)  HR: 64 (14 Aug 2017 15:29) (64 - 88)  BP: 94/46 (14 Aug 2017 15:29) (94/44 - 140/100)  BP(mean): --  RR: 20 (14 Aug 2017 15:29) (18 - 28)  SpO2: 93% (14 Aug 2017 15:29) (92% - 94%)  Daily     Daily     GENERAL:  Appears stated age, well-groomed, well-nourished, no distress  HEENT:  NC/AT,  conjunctivae clear and pink, no thyromegaly, nodules, adenopathy, no JVD, sclera -anicteric  CHEST:  Full & symmetric excursion, no increased effort, breath sounds clear  HEART:  Regular rhythm, S1, S2, no murmur/rub/S3/S4, no abdominal bruit, no edema  ABDOMEN:  Soft, non-tender, + distened, normoactive bowel sounds,  no masses ,no hepato-splenomegaly, no signs of chronic liver disease  EXTREMITIES  no cyanosis,clubbing or edema  SKIN:  No rash/erythema/ecchymoses/petechiae/wounds/abscess/warm/dry  NEURO:  AAO x 1-2, no asterixis, no tremor, no encephalopathy    LABS:                        11.1   13.1  )-----------( 227      ( 14 Aug 2017 09:46 )             34.0     08-14    143  |  109<H>  |  42<H>  ----------------------------<  157<H>  3.9   |  23  |  1.80<H>    Ca    8.1<L>      14 Aug 2017 09:46    TPro  6.4  /  Alb  2.8<L>  /  TBili  0.4  /  DBili  x   /  AST  21  /  ALT  16  /  AlkPhos  49  08-14    LIVER FUNCTIONS - ( 14 Aug 2017 09:46 )  Alb: 2.8 g/dL / Pro: 6.4 g/dL / ALK PHOS: 49 U/L / ALT: 16 U/L / AST: 21 U/L / GGT: x           PT/INR - ( 14 Aug 2017 09:46 )   PT: 13.2 sec;   INR: 1.21 ratio         PTT - ( 14 Aug 2017 09:46 )  PTT:25.1 sec        Imaging:

## 2017-08-14 NOTE — CONSULT NOTE ADULT - PROBLEM SELECTOR RECOMMENDATION 9
abdominal x-ray ordered, f/u  On Zofran 4 mg IV prn nausea/vomiting   Improving  Will monitor abdominal x-ray ordered, f/u to r/o ileus given abd distention  On Zofran 4 mg IV prn nausea/vomiting   Improving  Will monitor

## 2017-08-14 NOTE — CONSULT NOTE ADULT - SUBJECTIVE AND OBJECTIVE BOX
HPI:  89 yo white male from [facility] in the area, vomited x 1 and now sent here for evaluation and management. On arrival here, patient appears dyspneic with O2 sat of 94% on room air though patient denies any SOB. Admits to slight cough but No CP/AP/Back Pain. ABG showing hypoxia, to have VQ scan. As per daughter who is his hcp, pt has been deteriorating for the past one year. Lives at assisted living faciltiy and has a 24 hr aid. (14 Aug 2017 13:13)      PAST MEDICAL & SURGICAL HISTORY:  Stented coronary artery: x 3, most recent stent 6 months ago  AAA (abdominal aortic aneurysm)  GERD (gastroesophageal reflux disease)  HLD (hyperlipidemia)  HTN (hypertension)  CAD (coronary artery disease)  History of heart artery stent       SOCIAL HISTORY:                                     Admitted from:    [facility]       FAMILY HISTORY:  No pertinent family history in first degree relatives      MEDICATIONS  (STANDING):  sodium chloride 0.9%. 1000 milliLiter(s) (60 mL/Hr) IV Continuous <Continuous>    MEDICATIONS  (PRN):  acetaminophen   Tablet 650 milliGRAM(s) Oral every 6 hours PRN Mild Pain  ondansetron Injectable 4 milliGRAM(s) IV Push every 6 hours PRN Nausea and/or Vomiting  morphine  - Injectable 2 milliGRAM(s) IV Push every 2 hours PRN pain/dyspnea/discomfort/breathlessness  LORazepam   Injectable 1 milliGRAM(s) IntraMuscular every 6 hours PRN anxiet/agitation  atropine 1% Ophthalmic Solution for SubLingual Use 4 Drop(s) SubLingual every 6 hours PRN terminal secretions      Allergies    No Known Allergies    Intolerances          ADVANCE DIRECTIVES:    DNR                     MOLST    Living Will      Surrogate/HCP/Guardian:    Amy Moore      Phone#:      Baseline ADLs (prior to admission):  Independent [ ] moderately [ x] fully   Dependent   [ ] moderately [ x]fully    Palliative Performance Status Version 2:           PPS Level -- 40%  Ambulation -- Mainly in bed  Activity & Evidence of Disease -- Unable to do most activity; Extensive disease  Self-Care -- Mainly assistance  Intake -- Normal or reduced  Conscious Level -- Full or drowsy +/- confusion      Review of Systems: Reviewed  Unable to obtain due to poor mentation   All others negative    Dyspnea: Y  Nausea/Vomiting: N  Anxiety:  Y  Depression: N  Fatigue: Y   Loss of appetite: Y    Pain: NONE            Character-            Duration-            Factors-            Frequency-            Location-            Severity-    Vital Signs Last 24 Hrs  T(C): 36.8 (14 Aug 2017 18:07), Max: 37.1 (14 Aug 2017 09:15)  T(F): 98.3 (14 Aug 2017 18:07), Max: 98.8 (14 Aug 2017 09:15)  HR: 102 (14 Aug 2017 18:07) (64 - 132)  BP: 119/79 (14 Aug 2017 18:07) (94/44 - 140/100)  BP(mean): --  RR: 22 (14 Aug 2017 18:07) (18 - 30)  SpO2: 83% (14 Aug 2017 18:07) (83% - 96%)      General: alert  oriented x _0___                                  ill appearing                   Lungs:   tachypnea/labored breathing  +ronchi        CV: S1S2 RRR      GI: normal,  soft,  NT,             MSK:  bedbound/wheelchair bound          LABS:                        11.1   13.1  )-----------( 227      ( 14 Aug 2017 09:46 )             34.0     08-14    143  |  109<H>  |  42<H>  ----------------------------<  157<H>  3.9   |  23  |  1.80<H>    Ca    8.1<L>      14 Aug 2017 09:46    TPro  6.4  /  Alb  2.8<L>  /  TBili  0.4  /  DBili  x   /  AST  21  /  ALT  16  /  AlkPhos  49  08-14    PT/INR - ( 14 Aug 2017 09:46 )   PT: 13.2 sec;   INR: 1.21 ratio         PTT - ( 14 Aug 2017 09:46 )  PTT:25.1 sec    I&O's Summary    14 Aug 2017 07:01  -  14 Aug 2017 21:12  --------------------------------------------------------  IN: 120 mL / OUT: 0 mL / NET: 120 mL        RADIOLOGY & ADDITIONAL STUDIES:      PSYCHOSOCIAL-SPIRITUAL ASSESSMENT:    _X__Reviewed     ___Care  plan unchanged     ___Care plan adjusted as below    GOALS OF CARE DISCUSSION  	_X__Palliative care info/counseling provided	    ___Family meeting  	__X_Advanced Directives addressed	    ___See previous Palliative Medicine Note    AGENCY CHOICE DISCUSSED:   ___HOSPICE   ___CALVARY  ___OTHER:              > 50% OF THE TIME SPENT IN COUNSELING AND COORDINATING CARE 	   Start:               End:  	       Minutes: 75      PROLONGED SERVICE             FACE TO FACE:    PT            PT & FAMILY	   Start:               End:  	       Minutes:      Advance Care Planning Time:

## 2017-08-14 NOTE — CHART NOTE - NSCHARTNOTEFT_GEN_A_CORE
Patient is a 88y old  Male who presents with a chief complaint of vomitting and unresponsiveness (14 Aug 2017 13:13)      Rapid response was called on this 89yo  Male patient for dyspnea.  Pt was seen and examined in ED with difficulty breathing.  Pt appeared confused and unable to find a position of comfort with breathing.  Pt was put on venturi mask 100% oxygen.  ICU team was present and Dr. Echevarria spoke with daughter who is healthcare proxy and she has agreed for comfort care.  Pt was given 2mg Morphine IV for dyspnea.  Symptoms mildly improved.    Patient now on [facility]. Examined at bedside on nonrebreather mask. He follows commands, but is not yet verbal. As per the patient's son, he is doing much better than before, but still does not recognize his son. ROS unable to provide due to patient not speaking when prompted.     Vital Signs Last 24 Hrs  T(C): 36.8 (14 Aug 2017 18:07), Max: 37.1 (14 Aug 2017 09:15)  T(F): 98.3 (14 Aug 2017 18:07), Max: 98.8 (14 Aug 2017 09:15)  HR: 102 (14 Aug 2017 18:07) (64 - 132)  BP: 119/79 (14 Aug 2017 18:07) (94/44 - 140/100)  BP(mean): --  RR: 22 (14 Aug 2017 18:07) (18 - 30)  SpO2: 83% (14 Aug 2017 18:07) (83% - 96%)                          11.1   13.1  )-----------( 227      ( 14 Aug 2017 09:46 )             34.0     14 Aug 2017 09:46    143    |  109    |  42     ----------------------------<  157    3.9     |  23     |  1.80     Ca    8.1        14 Aug 2017 09:46    TPro  6.4    /  Alb  2.8    /  TBili  0.4    /  DBili  x      /  AST  21     /  ALT  16     /  AlkPhos  49     14 Aug 2017 09:46    LIVER FUNCTIONS - ( 14 Aug 2017 09:46 )  Alb: 2.8 g/dL / Pro: 6.4 g/dL / ALK PHOS: 49 U/L / ALT: 16 U/L / AST: 21 U/L / GGT: x           PT/INR - ( 14 Aug 2017 09:46 )   PT: 13.2 sec;   INR: 1.21 ratio         PTT - ( 14 Aug 2017 09:46 )  PTT:25.1 sec  CAPILLARY BLOOD GLUCOSE        CARDIAC MARKERS ( 14 Aug 2017 09:46 )  .022 ng/mL / x     / x     / x     / x          PE:    General: On nonrebreather mask, NAD, appears comfortable  Neuro: Follows commands, nonverbal  Cardio: Normal S1 and S2, RRR, no murmurs  Lungs: Coarse breath sounds b/l  Abdomen: Soft, nontender  Extremities: no peripheral edema    A/P: 89yo M with PMHx of CAD s/p cardiac stents, HTN, HLD presented for acute dyspnea.  Clinically, patient appears to be doing better than when the RR was called. Patient is now comfortable and no longer in respiratory distress, but has not returned to his baseline mental status.   -Patient admitted to 1[facility]  -Comfort care  -Dilaudid 1mg IV q2 PRN  -Monitor SPO2  -ICU Attending Dr. Echevarria notified PMD Dr. Segal  -ICU Attending Dr. Echevarria spoke with family and they agree with comfort care'  -Pt is DNR

## 2017-08-14 NOTE — ED PROVIDER NOTE - CARE PLAN
Principal Discharge DX:	Dyspnea, unspecified type  Secondary Diagnosis:	Non-intractable vomiting, presence of nausea not specified, unspecified vomiting type

## 2017-08-14 NOTE — ED ADULT NURSE NOTE - ED STAT RN HANDOFF DETAILS 3
At 1600, Patient became diaphoretic, vomited coffee grounds, very restless, and oxygen sat dropped to 84.  Rapid response called.  Dr. Echevarria ICU intensivist responded and assessed the patient.  Patient was suctioned. Non rebreather applied.  Dr. Segal made aware of situation.  Dr. Echevarria contacted daughter and decision was made to make the patient palliative care.  Morphine administered.  1:1 at bedside for safety.  See rapid response sheet for further details.

## 2017-08-14 NOTE — ED ADULT NURSE NOTE - PMH
AAA (abdominal aortic aneurysm)    CAD (coronary artery disease)    GERD (gastroesophageal reflux disease)    HLD (hyperlipidemia)    HTN (hypertension)    Stented coronary artery  x 3, most recent stent 6 months ago

## 2017-08-14 NOTE — ED ADULT NURSE NOTE - ED STAT RN HANDOFF DETAILS 2
Dr. Segal aware of change in status.  She wants the 3rd liter held, and NS at maintenance dose, she will have Triveddy see the patient.

## 2017-08-14 NOTE — ED ADULT NURSE REASSESSMENT NOTE - NS ED NURSE REASSESS COMMENT FT1
pt appears more comfortable, calm, wakes to voice, remains tachypneic at 24
pt continues to be restless, diff breathing, with audible rhonchi, pt remains on NRB, pt condition not changes since receiving morphine.  Pt on comfort measures at this time. Stefani primary RN aware. Will medicated patient with PRN dilaudid at this time .
Patient increasingly confused, trying to pull at oxygen and IV, labored breathing with audible wheezing.  Duoneb administered.

## 2017-08-14 NOTE — PROVIDER CONTACT NOTE (OTHER) - SITUATION
handed over from ER RN that pt will be comfort care and no order noted  oxygen saturation checked 83% with nonbreather mask

## 2017-08-14 NOTE — PROVIDER CONTACT NOTE (OTHER) - ACTION/TREATMENT ORDERED:
Will d/c [place]  Will d/c remote tele  maintain nonbreather mask  administer morphine for breathing distress

## 2017-08-14 NOTE — ED PROVIDER NOTE - OBJECTIVE STATEMENT
89 yo white male from [facility] in the area, vomited x 1 and now sent here for evaluation and management. On arrival here, patient appears dyspneic with O2 sat of 94% on room air though patient denies any SOB. Admits to slight cough but No CP/AP/Back Pain.

## 2017-08-14 NOTE — ED ADULT NURSE NOTE - ED STAT RN HANDOFF DETAILS
Dr. Slaughter aware we were unable to obtain urine specimen by straight cath.  Cannot advance Catheter past the prostate

## 2017-08-14 NOTE — CHART NOTE - NSCHARTNOTEFT_GEN_A_CORE
Responded to RRT for coffee ground emesis, resp distress, agitation.        Spo2 84% on RA  awake, responds to verbal commands appropriately  severe resp distress  Lungs with coarse BS b/l  S1S2, regular  Abdomen distended, firm, tender    Patient likely aspirated.   He is DNR, DNI  D/w daughter and HCP - agrees with comfort care in view of declining health, respiratory failure and DNR/DNI status.   Updated Dr. Segal  Ordered morphine 2 mg x 1 and prn dilaudid  Dr. Segal to call palliative care

## 2017-08-14 NOTE — H&P ADULT - HISTORY OF PRESENT ILLNESS
89 yo white male from [facility] [place] the [place], vomited x 1 and now sent here for evaluation and management. On arrival here, patient appears dyspneic with O2 sat of 94% on room air though patient denies any SOB. Admits to slight cough but No CP/AP/Back Pain. ABG showing hypoxia, to have VQ scan. As per daughter who is his hcp, pt has been deteriorating for the past one year. Lives at assisted living faciltiy and has a 24 hr aid.

## 2017-08-14 NOTE — CONSULT NOTE ADULT - ASSESSMENT
This is an 88 M comes in respiratory distress with possible aspiration PNA. Family does not aggressive measures due to patient's over decline in the past year. They want comfort measures only.

## 2017-08-15 DIAGNOSIS — K56.7 ILEUS, UNSPECIFIED: ICD-10-CM

## 2017-08-15 LAB
-  CANDIDA ALBICANS: SIGNIFICANT CHANGE UP
-  CANDIDA GLABRATA: SIGNIFICANT CHANGE UP
-  CANDIDA KRUSEI: SIGNIFICANT CHANGE UP
-  CANDIDA PARAPSILOSIS: SIGNIFICANT CHANGE UP
-  CANDIDA TROPICALIS: SIGNIFICANT CHANGE UP
-  COAGULASE NEGATIVE STAPHYLOCOCCUS: SIGNIFICANT CHANGE UP
-  K. PNEUMONIAE GROUP: SIGNIFICANT CHANGE UP
-  KPC RESISTANCE GENE: SIGNIFICANT CHANGE UP
-  STREPTOCOCCUS SP. (NOT GRP A, B OR S PNEUMONIAE): SIGNIFICANT CHANGE UP
A BAUMANNII DNA SPEC QL NAA+PROBE: SIGNIFICANT CHANGE UP
ANION GAP SERPL CALC-SCNC: 9 MMOL/L — SIGNIFICANT CHANGE UP (ref 5–17)
BUN SERPL-MCNC: 51 MG/DL — HIGH (ref 7–23)
CALCIUM SERPL-MCNC: 8.4 MG/DL — LOW (ref 8.5–10.1)
CHLORIDE SERPL-SCNC: 113 MMOL/L — HIGH (ref 96–108)
CO2 SERPL-SCNC: 23 MMOL/L — SIGNIFICANT CHANGE UP (ref 22–31)
CREAT SERPL-MCNC: 2.4 MG/DL — HIGH (ref 0.5–1.3)
E CLOAC COMP DNA BLD POS QL NAA+PROBE: SIGNIFICANT CHANGE UP
E COLI DNA BLD POS QL NAA+NON-PROBE: SIGNIFICANT CHANGE UP
ENTEROCOC DNA BLD POS QL NAA+NON-PROBE: SIGNIFICANT CHANGE UP
ENTEROCOC DNA BLD POS QL NAA+NON-PROBE: SIGNIFICANT CHANGE UP
GLUCOSE SERPL-MCNC: 78 MG/DL — SIGNIFICANT CHANGE UP (ref 70–99)
GP B STREP DNA BLD POS QL NAA+NON-PROBE: SIGNIFICANT CHANGE UP
GRAM STN FLD: SIGNIFICANT CHANGE UP
HAEM INFLU DNA BLD POS QL NAA+NON-PROBE: SIGNIFICANT CHANGE UP
HCT VFR BLD CALC: 28.4 % — LOW (ref 39–50)
HGB BLD-MCNC: 9.3 G/DL — LOW (ref 13–17)
K OXYTOCA DNA BLD POS QL NAA+NON-PROBE: SIGNIFICANT CHANGE UP
L MONOCYTOG DNA BLD POS QL NAA+NON-PROBE: SIGNIFICANT CHANGE UP
MCHC RBC-ENTMCNC: 29.4 PG — SIGNIFICANT CHANGE UP (ref 27–34)
MCHC RBC-ENTMCNC: 32.6 GM/DL — SIGNIFICANT CHANGE UP (ref 32–36)
MCV RBC AUTO: 90.2 FL — SIGNIFICANT CHANGE UP (ref 80–100)
METHOD TYPE: SIGNIFICANT CHANGE UP
MRSA SPEC QL CULT: SIGNIFICANT CHANGE UP
MSSA DNA SPEC QL NAA+PROBE: SIGNIFICANT CHANGE UP
N MEN ISLT CULT: SIGNIFICANT CHANGE UP
P AERUGINOSA DNA BLD POS NAA+NON-PROBE: SIGNIFICANT CHANGE UP
PLATELET # BLD AUTO: 130 K/UL — LOW (ref 150–400)
POTASSIUM SERPL-MCNC: 4.7 MMOL/L — SIGNIFICANT CHANGE UP (ref 3.5–5.3)
POTASSIUM SERPL-SCNC: 4.7 MMOL/L — SIGNIFICANT CHANGE UP (ref 3.5–5.3)
PROTEUS SP DNA BLD POS QL NAA+NON-PROBE: SIGNIFICANT CHANGE UP
RBC # BLD: 3.15 M/UL — LOW (ref 4.2–5.8)
RBC # FLD: 15.9 % — HIGH (ref 10.3–14.5)
S MARCESCENS DNA BLD POS NAA+NON-PROBE: SIGNIFICANT CHANGE UP
S PNEUM DNA BLD POS QL NAA+NON-PROBE: SIGNIFICANT CHANGE UP
S PYO DNA BLD POS QL NAA+NON-PROBE: SIGNIFICANT CHANGE UP
SODIUM SERPL-SCNC: 145 MMOL/L — SIGNIFICANT CHANGE UP (ref 135–145)
SPECIMEN SOURCE: SIGNIFICANT CHANGE UP
WBC # BLD: 7.6 K/UL — SIGNIFICANT CHANGE UP (ref 3.8–10.5)
WBC # FLD AUTO: 7.6 K/UL — SIGNIFICANT CHANGE UP (ref 3.8–10.5)

## 2017-08-15 RX ADMIN — MORPHINE SULFATE 2 MILLIGRAM(S): 50 CAPSULE, EXTENDED RELEASE ORAL at 00:28

## 2017-08-15 RX ADMIN — Medication 1 MILLIGRAM(S): at 01:10

## 2017-08-15 RX ADMIN — MORPHINE SULFATE 2 MILLIGRAM(S): 50 CAPSULE, EXTENDED RELEASE ORAL at 22:47

## 2017-08-15 RX ADMIN — MORPHINE SULFATE 2 MILLIGRAM(S): 50 CAPSULE, EXTENDED RELEASE ORAL at 09:25

## 2017-08-15 RX ADMIN — MORPHINE SULFATE 2 MILLIGRAM(S): 50 CAPSULE, EXTENDED RELEASE ORAL at 14:40

## 2017-08-15 RX ADMIN — MORPHINE SULFATE 2 MILLIGRAM(S): 50 CAPSULE, EXTENDED RELEASE ORAL at 06:35

## 2017-08-15 RX ADMIN — MORPHINE SULFATE 2 MILLIGRAM(S): 50 CAPSULE, EXTENDED RELEASE ORAL at 14:23

## 2017-08-15 RX ADMIN — MORPHINE SULFATE 2 MILLIGRAM(S): 50 CAPSULE, EXTENDED RELEASE ORAL at 05:39

## 2017-08-15 RX ADMIN — MORPHINE SULFATE 2 MILLIGRAM(S): 50 CAPSULE, EXTENDED RELEASE ORAL at 00:00

## 2017-08-15 RX ADMIN — MORPHINE SULFATE 2 MILLIGRAM(S): 50 CAPSULE, EXTENDED RELEASE ORAL at 09:40

## 2017-08-15 RX ADMIN — SODIUM CHLORIDE 60 MILLILITER(S): 9 INJECTION INTRAMUSCULAR; INTRAVENOUS; SUBCUTANEOUS at 05:43

## 2017-08-15 NOTE — PROVIDER CONTACT NOTE (CRITICAL VALUE NOTIFICATION) - SITUATION
mr [id number]  [facility]0/25/1928
call out to Dr LEONOR Segal
Md called to be made aware

## 2017-08-15 NOTE — PROGRESS NOTE ADULT - SUBJECTIVE AND OBJECTIVE BOX
Patient is a 88y old  Male who presents with a chief complaint of vomitting and unresponsiveness (14 Aug 2017 13:13)      Subjective: Patient seen and examined at bedside. No acute events overnight.     PAIN: N  DYSPNEA: mild	  NAUS/VOM: N	  SECRETIONS: N	  AGITATION: N    OTHER REVIEW OF SYSTEMS:  UNABLE TO OBTAIN  due to: poor mentation    T(C): 36.8 (08-14-17 @ 18:07), Max: 36.8 (08-14-17 @ 18:07)  HR: 102 (08-14-17 @ 18:07) (64 - 132)  BP: 119/79 (08-14-17 @ 18:07) (94/44 - 140/100)  RR: 22 (08-14-17 @ 18:07) (18 - 30)  SpO2: 83% (08-14-17 @ 18:07) (83% - 96%)  Wt(kg): --  GENERAL:  resting comfortably in NAD  HEENT:  NC/AT EOMI PERRL  NECK: supple no JVD  CVS:  +S1 S2 RRR  RESP: CTA B/L  GI:  soft NT/ND +BS  : no suprapubic tenderness  MUSC:  no lower extremity edema  NEURO:  AAOX3, no focal motor or sensory deficits   PSYCH:  Alert, Calm, Cooperative   SKIN:  Warm, moist, no rashes   LYMPH: normal     MEDS REVIEWED	          OPIATE NAÏVE (Y/N)    No Known Allergies      LABS REVIEWED:                        9.3    7.6   )-----------( 130      ( 15 Aug 2017 08:32 )             28.4     08-15    145  |  113<H>  |  51<H>  ----------------------------<  78  4.7   |  23  |  2.40<H>    Ca    8.4<L>      15 Aug 2017 08:32    TPro  6.4  /  Alb  2.8<L>  /  TBili  0.4  /  DBili  x   /  AST  21  /  ALT  16  /  AlkPhos  49  08-14      IMAGING REVIEWED    ADVANCED DIRECTIVES:         DNR     DNI     LIVING WILL     MOLST    PSYCHOSOCIAL-SPIRITUAL ASSESSMENT:    ___Reviewed     _x__Care  plan unchanged     ___Care plan adjusted as below    GOALS OF CARE DISCUSSION  	_x__Palliative care info/counseling provided	    ___Family meeting  	_x__Advanced Directives addressed	    __x_See previous Palliative Medicine Note    AGENCY CHOICE DISCUSSED:   ___HOSPICE   ___CALVARY  ___OTHER:              > 50% OF THE TIME SPENT IN COUNSELING AND COORDINATING CARE 	   Start:               End:  	       Minutes:  35      PROLONGED SERVICE             FACE TO FACE:    PT            PT & FAMILY	   Start:               End:  	       Minutes:      Advance Care Planning Time:

## 2017-08-15 NOTE — PROGRESS NOTE ADULT - SUBJECTIVE AND OBJECTIVE BOX
Interval history: patient to be comfort measures only by family       HPI: This is an 88 year old male from [facility] in the area, who presents to the emergency room after 1 episode of vomiting.  On arrival to the ER patient appears dyspneic with O2 sat of 94% on room air though patient denies any SOB. Admits to slight cough but No CP/AP/Back Pain. ABG showing hypoxia, to have VQ scan. As per daughter who is his hcp, pt has been deteriorating for the past one year. Lives at assisted living facility and has a 24 hr aid. Pt admits mild epigastric pain. Pt denies diarrhea , constipation, hematemesis, hematochezia.    Allergies:  No Known Allergies      Medications:  finasteride 5 milliGRAM(s) Oral daily  acetaminophen   Tablet 650 milliGRAM(s) Oral every 6 hours PRN  aspirin enteric coated 81 milliGRAM(s) Oral daily  tamsulosin 0.8 milliGRAM(s) Oral at bedtime  FLUoxetine 60 milliGRAM(s) Oral at bedtime  atorvastatin 40 milliGRAM(s) Oral at bedtime  ticagrelor 90 milliGRAM(s) Oral two times a day  metoprolol succinate ER 25 milliGRAM(s) Oral daily  docusate sodium 100 milliGRAM(s) Oral two times a day  pantoprazole    Tablet 40 milliGRAM(s) Oral before breakfast  cyanocobalamin 1000 MICROGram(s) Oral daily  sodium chloride 0.9%. 1000 milliLiter(s) IV Continuous <Continuous>  cefTRIAXone   IVPB   IV Intermittent   ondansetron Injectable 4 milliGRAM(s) IV Push every 6 hours PRN  megestrol Suspension 800 milliGRAM(s) Oral at bedtime  ALBUTerol    0.083%. 2.5 milliGRAM(s) Nebulizer once      ROS:     General:  No wt loss, fevers, chills, night sweats, fatigue,   Eyes:  Good vision, no reported pain  ENT:  No sore throat, pain, runny nose, dysphagia  CV:  No pain, palpitations, hypo/hypertension  Resp:  No dyspnea, cough, tachypnea, wheezing  GI:  +nausea/vomiting, mild epigastric pain   :  No pain, bleeding, incontinence, nocturia  Muscle:  No pain, weakness  Neuro:  No weakness, tingling, memory problems  Psych:  No fatigue, insomnia, mood problems, depression  Endocrine:  No polyuria, polydipsia, cold/heat intolerance  Heme:  No petechiae, ecchymosis, easy bruisability  Skin:  No rash, tattoos, scars, edema      PHYSICAL EXAM:   Vital Signs:  Vital Signs Last 24 Hrs  T(C): 37.1 (14 Aug 2017 09:15), Max: 37.1 (14 Aug 2017 09:15)  T(F): 98.8 (14 Aug 2017 09:15), Max: 98.8 (14 Aug 2017 09:15)  HR: 64 (14 Aug 2017 15:29) (64 - 88)  BP: 94/46 (14 Aug 2017 15:29) (94/44 - 140/100)  BP(mean): --  RR: 20 (14 Aug 2017 15:29) (18 - 28)  SpO2: 93% (14 Aug 2017 15:29) (92% - 94%)  Daily     Daily     GENERAL:  Appears stated age, well-groomed, well-nourished, no distress  HEENT:  NC/AT,  conjunctivae clear and pink, no thyromegaly, nodules, adenopathy, no JVD, sclera -anicteric  CHEST:  Full & symmetric excursion, no increased effort, breath sounds clear  HEART:  Regular rhythm, S1, S2, no murmur/rub/S3/S4, no abdominal bruit, no edema  ABDOMEN:  Soft, non-tender, + distened, normoactive bowel sounds,  no masses ,no hepato-splenomegaly, no signs of chronic liver disease  EXTREMITIES  no cyanosis,clubbing or edema  SKIN:  No rash/erythema/ecchymoses/petechiae/wounds/abscess/warm/dry  NEURO:  AAO x 1-2, no asterixis, no tremor, no encephalopathy    LABS: reviewed  Imaging: reviewed

## 2017-08-15 NOTE — DIETITIAN INITIAL EVALUATION ADULT. - OTHER INFO
patient for hospice consult , seen by RD on mechanical soft diet. from assisted living. dx ileus change in mental status. comfort care currently

## 2017-08-15 NOTE — PROGRESS NOTE ADULT - SUBJECTIVE AND OBJECTIVE BOX
Patient is a 88y old  Male who presents with a chief complaint of vomitting and unresponsiveness (14 Aug 2017 13:13)      INTERVAL HPI/OVERNIGHT EVENTS:pt on comfort care, on 100% nrb mask, spoke to daughter rodrigo this am.    MEDICATIONS  (STANDING):    MEDICATIONS  (PRN):  acetaminophen   Tablet 650 milliGRAM(s) Oral every 6 hours PRN Mild Pain  ondansetron Injectable 4 milliGRAM(s) IV Push every 6 hours PRN Nausea and/or Vomiting  morphine  - Injectable 2 milliGRAM(s) IV Push every 2 hours PRN pain/dyspnea/discomfort/breathlessness  LORazepam   Injectable 1 milliGRAM(s) IntraMuscular every 6 hours PRN anxiet/agitation  atropine 1% Ophthalmic Solution for SubLingual Use 4 Drop(s) SubLingual every 6 hours PRN terminal secretions      Allergies    No Known Allergies    Intolerances        REVIEW OF SYSTEMS:  CONSTITUTIONAL: No fever, weight loss, or fatigue  EYES: No eye pain, visual disturbances  ENMT:  No difficulty hearing, tinnitus, vertigo; No sinus or throat pain  NECK: No pain or stiffness  RESPIRATORY: resp distress  CARDIOVASCULAR: No chest pain, palpitations, dizziness  GASTROINTESTINAL: No abdominal or epigastric pain. No nausea, vomiting, or hematemesis; No diarrhea or constipation. No melena or hematochezia.  GENITOURINARY: No dysuria, frequency, hematuria, or incontinence  NEUROLOGICAL: No headaches, memory loss, loss of strength, numbness, or tremors  SKIN: No itching, burning  LYMPH NODES: No enlarged glands  MUSCULOSKELETAL: No joint pain or swelling; No muscle, back, or extremity pain  PSYCHIATRIC: No depression, mood swings  HEME/LYMPH: No easy bruising, or bleeding gums      Vital Signs Last 24 Hrs  T(C): 36.8 (14 Aug 2017 18:07), Max: 36.8 (14 Aug 2017 18:07)  T(F): 98.3 (14 Aug 2017 18:07), Max: 98.3 (14 Aug 2017 18:07)  HR: 102 (14 Aug 2017 18:07) (64 - 132)  BP: 119/79 (14 Aug 2017 18:07) (94/44 - 140/100)  BP(mean): --  RR: 22 (14 Aug 2017 18:07) (18 - 30)  SpO2: 83% (14 Aug 2017 18:07) (83% - 96%)    PHYSICAL EXAM:  GENERAL:sleeping, calm  HEAD:  Atraumatic, Normocephalic  EYES: EOMI, PERRLA, conjunctiva and sclera clear  ENMT: No tonsillar erythema, exudates, or enlargement   NECK: Supple, No JVD  NERVOUS SYSTEM:  lethargic, sleeping  CHEST/LUNG: b/l rhonci  HEART: Regular rate and rhythm  ABDOMEN:less distended  EXTREMITIES:  2+ Peripheral Pulses   LYMPH: No lymphadenopathy noted  SKIN: No rashes     LABS:                        9.3    7.6   )-----------( 130      ( 15 Aug 2017 08:32 )             28.4     15 Aug 2017 08:32    145    |  113    |  51     ----------------------------<  78     4.7     |  23     |  2.40     Ca    8.4        15 Aug 2017 08:32      PT/INR - ( 14 Aug 2017 09:46 )   PT: 13.2 sec;   INR: 1.21 ratio         PTT - ( 14 Aug 2017 09:46 )  PTT:25.1 sec    CAPILLARY BLOOD GLUCOSE                RADIOLOGY & ADDITIONAL TESTS:  < from: Xray Abdomen Series Flat/Upright 2 View (08.14.17 @ 17:34) >  XAM:  ABDOMEN TWO VIEW                            PROCEDURE DATE:  08/14/2017          INTERPRETATION:  History: Vomiting.    AP spine upright. Prior 7/3/2016.    Mild to moderate generalized gaseous distention of bowel compatible with   ileus. Nodefinite obstruction. No free air. No opaque calculi.   Aortoiliac stent graft. Wire fixation left sacrum. Clips bilateral   inguinal region. [place] project over right sacrum.    Impression: Ileus. Follow-up as clinically indicated                  ELLIE ROCHE M.D., ATTENDING RADIOLOGIST  This document has been electronically signed. Aug 15 2017  9:13AM        < end of copied text >        Consultant(s) Notes Reviewed:  [ x] YES  [ ] NO    Care Discussed with Consultants/Other Providers x[ ] YES  [ ] NO    Advanced Care Planning Discussed with Patien/Family [x] YES  [ ] NO

## 2017-08-16 LAB
CULTURE RESULTS: SIGNIFICANT CHANGE UP
ORGANISM # SPEC MICROSCOPIC CNT: SIGNIFICANT CHANGE UP
ORGANISM # SPEC MICROSCOPIC CNT: SIGNIFICANT CHANGE UP
SPECIMEN SOURCE: SIGNIFICANT CHANGE UP

## 2017-08-16 RX ADMIN — MORPHINE SULFATE 2 MILLIGRAM(S): 50 CAPSULE, EXTENDED RELEASE ORAL at 19:12

## 2017-08-16 RX ADMIN — MORPHINE SULFATE 2 MILLIGRAM(S): 50 CAPSULE, EXTENDED RELEASE ORAL at 06:41

## 2017-08-16 RX ADMIN — MORPHINE SULFATE 2 MILLIGRAM(S): 50 CAPSULE, EXTENDED RELEASE ORAL at 08:30

## 2017-08-16 RX ADMIN — MORPHINE SULFATE 2 MILLIGRAM(S): 50 CAPSULE, EXTENDED RELEASE ORAL at 08:45

## 2017-08-16 RX ADMIN — Medication 1 MILLIGRAM(S): at 10:45

## 2017-08-16 RX ADMIN — MORPHINE SULFATE 2 MILLIGRAM(S): 50 CAPSULE, EXTENDED RELEASE ORAL at 13:49

## 2017-08-16 RX ADMIN — MORPHINE SULFATE 2 MILLIGRAM(S): 50 CAPSULE, EXTENDED RELEASE ORAL at 14:05

## 2017-08-16 RX ADMIN — MORPHINE SULFATE 2 MILLIGRAM(S): 50 CAPSULE, EXTENDED RELEASE ORAL at 06:18

## 2017-08-16 NOTE — PROGRESS NOTE ADULT - SUBJECTIVE AND OBJECTIVE BOX
Patient is a 88y old  Male who presents with a chief complaint of vomitting and unresponsiveness (14 Aug 2017 13:13)      INTERVAL HPI/OVERNIGHT EVENTS: stauts unchanged, still on 100 nrb mask, await hospice eval    MEDICATIONS  (STANDING):    MEDICATIONS  (PRN):  acetaminophen   Tablet 650 milliGRAM(s) Oral every 6 hours PRN Mild Pain  ondansetron Injectable 4 milliGRAM(s) IV Push every 6 hours PRN Nausea and/or Vomiting  morphine  - Injectable 2 milliGRAM(s) IV Push every 2 hours PRN pain/dyspnea/discomfort/breathlessness  LORazepam   Injectable 1 milliGRAM(s) IntraMuscular every 6 hours PRN anxiet/agitation  atropine 1% Ophthalmic Solution for SubLingual Use 4 Drop(s) SubLingual every 6 hours PRN terminal secretions  LORazepam   Injectable 1 milliGRAM(s) IV Push every 6 hours PRN agitation/anxiety      Allergies    No Known Allergies    Intolerances        REVIEW OF SYSTEMS:  CONSTITUTIONAL: No fever, weight loss, or fatigue  EYES: No eye pain, visual disturbances  ENMT:  No difficulty hearing, tinnitus, vertigo; No sinus or throat pain  NECK: No pain or stiffness  RESPIRATORY: resp distress  CARDIOVASCULAR: No chest pain, palpitations, dizziness  GASTROINTESTINAL: No abdominal or epigastric pain. No nausea, vomiting, or hematemesis; No diarrhea or constipation. No melena or hematochezia.  GENITOURINARY: No dysuria, frequency, hematuria, or incontinence  NEUROLOGICAL: No headaches, memory loss, loss of strength, numbness, or tremors  SKIN: No itching, burning  LYMPH NODES: No enlarged glands  MUSCULOSKELETAL: No joint pain or swelling; No muscle, back, or extremity pain  PSYCHIATRIC: No depression, mood swings  HEME/LYMPH: No easy bruising, or bleeding gums  ALLERGY AND IMMUNOLOGIC: No hives    Vital Signs Last 24 Hrs  T(C): --  T(F): --  HR: --  BP: --  BP(mean): --  RR: --  SpO2: --    PHYSICAL EXAM:  GENERAL: NAD, well-groomed, well-developed  HEAD:  Atraumatic, Normocephalic  EYES: EOMI, PERRLA, conjunctiva and sclera clear  ENMT: No tonsillar erythema, exudates, or enlargement   NECK: Supple, No JVD  NERVOUS SYSTEM: awake, responds to verbal command  CHEST/LUNG: b/l rhonci  HEART: Regular rate and rhythm  ABDOMEN: Soft, Nontender, Nondistended; Bowel sounds present  EXTREMITIES:  2+ Peripheral Pulses   LYMPH: No lymphadenopathy noted  SKIN: No rashes     LABS:      Ca    8.4        15 Aug 2017 08:32          CAPILLARY BLOOD GLUCOSE                RADIOLOGY & ADDITIONAL TESTS:  no new      Consultant(s) Notes Reviewed:  [x ] YES  [ ] NO    Care Discussed with Consultants/Other Providers [x ] YES  [ ] NO    Advanced Care Planning Discussed with Patien/Family [ ] YES  [x ] NO

## 2017-08-16 NOTE — PROGRESS NOTE ADULT - SUBJECTIVE AND OBJECTIVE BOX
HPI: Pt to be comfort measures only by family    Interval Events:This is an 88 year old male from [facility] in the area, who presents to the emergency room after 1 episode of vomiting.  On arrival to the ER patient appears dyspneic with O2 sat of 94% on room air though patient denies any SOB. Admits to slight cough but No CP/AP/Back Pain. ABG showing hypoxia, to have VQ scan. As per daughter who is his hcp, pt has been deteriorating for the past one year. Lives at assisted living facility and has a 24 hr aid. Pt admits mild epigastric pain. Pt denies diarrhea , constipation, hematemesis, hematochezia.      MEDICATIONS  (STANDING):    MEDICATIONS  (PRN):  acetaminophen   Tablet 650 milliGRAM(s) Oral every 6 hours PRN Mild Pain  ondansetron Injectable 4 milliGRAM(s) IV Push every 6 hours PRN Nausea and/or Vomiting  morphine  - Injectable 2 milliGRAM(s) IV Push every 2 hours PRN pain/dyspnea/discomfort/breathlessness  LORazepam   Injectable 1 milliGRAM(s) IntraMuscular every 6 hours PRN anxiet/agitation  atropine 1% Ophthalmic Solution for SubLingual Use 4 Drop(s) SubLingual every 6 hours PRN terminal secretions  LORazepam   Injectable 1 milliGRAM(s) IV Push every 6 hours PRN agitation/anxiety      Allergies    No Known Allergies    Intolerances        Review of Systems:    General:  No wt loss, fevers, chills, night sweats, fatigue,   Eyes:  Good vision, no reported pain  ENT:  No sore throat, pain, runny nose, dysphagia  CV:  No pain, palpitations, hypo/hypertension  Resp:  No dyspnea, cough, tachypnea, wheezing  GI:  +nausea/vomiting, mild epigastric pain   :  No pain, bleeding, incontinence, nocturia  Muscle:  No pain, weakness  Neuro:  No weakness, tingling, memory problems  Psych:  No fatigue, insomnia, mood problems, depression  Endocrine:  No polyuria, polydipsia, cold/heat intolerance  Heme:  No petechiae, ecchymosis, easy bruisability  Skin:  No rash, tattoos, scars, edema      Vital Signs Last 24 Hrs  T(C): --  T(F): --  HR: --  BP: --  BP(mean): --  RR: --  SpO2: --    PHYSICAL EXAM:    GENERAL:  Appears stated age, well-groomed, well-nourished, no distress  HEENT:  NC/AT,  conjunctivae clear and pink, no thyromegaly, nodules, adenopathy, no JVD, sclera -anicteric  CHEST:  Full & symmetric excursion, no increased effort, breath sounds clear  HEART:  Regular rhythm, S1, S2, no murmur/rub/S3/S4, no abdominal bruit, no edema  ABDOMEN:  Soft, non-tender, + distened, normoactive bowel sounds,  no masses ,no hepato-splenomegaly, no signs of chronic liver disease  EXTREMITIES  no cyanosis,clubbing or edema  SKIN:  No rash/erythema/ecchymoses/petechiae/wounds/abscess/warm/dry  NEURO:  AAO x 1-2, no asterixis, no tremor, no encephalopathy        LABS:                        9.3    7.6   )-----------( 130      ( 15 Aug 2017 08:32 )             28.4     08-15    145  |  113<H>  |  51<H>  ----------------------------<  78  4.7   |  23  |  2.40<H>    Ca    8.4<L>      15 Aug 2017 08:32            RADIOLOGY & ADDITIONAL TESTS:

## 2017-08-16 NOTE — PROGRESS NOTE ADULT - SUBJECTIVE AND OBJECTIVE BOX
Patient is a 88y old  Male who presents with a chief complaint of vomitting and unresponsiveness (14 Aug 2017 13:13)      Subjective: Patient seen and examined at bedside. No acute events overnight.     PAIN: N  DYSPNEA: mild	  NAUS/VOM: N	  SECRETIONS: N	  AGITATION: Y    OTHER REVIEW OF SYSTEMS:  UNABLE TO OBTAIN  due to: poor mentation    T(C): 36.8 (08-14-17 @ 18:07), Max: 36.8 (08-14-17 @ 18:07)  HR: 102 (08-14-17 @ 18:07) (64 - 132)  BP: 119/79 (08-14-17 @ 18:07) (94/44 - 140/100)  RR: 22 (08-14-17 @ 18:07) (18 - 30)  SpO2: 83% (08-14-17 @ 18:07) (83% - 96%)  Wt(kg): --  GENERAL:  resting comfortably in NAD  HEENT:  NC/AT   NECK: supple no JVD  CVS:  +S1 S2 RRR  RESP: scattered ronchi  GI:  soft NT/ND +BS  : no suprapubic tenderness  MUSC:  no lower extremity edema  NEURO:  letahrgic  SKIN:  Warm, moist, no rashes   LYMPH: normal     MEDS REVIEWED	          OPIATE NAÏVE (Y/N)    No Known Allergies      LABS REVIEWED:                        9.3    7.6   )-----------( 130      ( 15 Aug 2017 08:32 )             28.4     08-15    145  |  113<H>  |  51<H>  ----------------------------<  78  4.7   |  23  |  2.40<H>    Ca    8.4<L>      15 Aug 2017 08:32    TPro  6.4  /  Alb  2.8<L>  /  TBili  0.4  /  DBili  x   /  AST  21  /  ALT  16  /  AlkPhos  49  08-14      IMAGING REVIEWED    ADVANCED DIRECTIVES:         DNR     DNI     LIVING WILL     MOLST    PSYCHOSOCIAL-SPIRITUAL ASSESSMENT:    ___Reviewed     _x__Care  plan unchanged     ___Care plan adjusted as below    GOALS OF CARE DISCUSSION  	_x__Palliative care info/counseling provided	    ___Family meeting  	_x__Advanced Directives addressed	    __x_See previous Palliative Medicine Note    AGENCY CHOICE DISCUSSED:   ___HOSPICE   ___CALVARY  ___OTHER:              > 50% OF THE TIME SPENT IN COUNSELING AND COORDINATING CARE 	   Start:               End:  	       Minutes:  35      PROLONGED SERVICE             FACE TO FACE:    PT            PT & FAMILY	   Start:               End:  	       Minutes:      Advance Care Planning Time:

## 2017-08-17 VITALS — TEMPERATURE: 100 F

## 2017-08-17 RX ADMIN — MORPHINE SULFATE 2 MILLIGRAM(S): 50 CAPSULE, EXTENDED RELEASE ORAL at 07:19

## 2017-08-17 RX ADMIN — MORPHINE SULFATE 2 MILLIGRAM(S): 50 CAPSULE, EXTENDED RELEASE ORAL at 20:08

## 2017-08-17 RX ADMIN — Medication 1 MILLIGRAM(S): at 14:26

## 2017-08-17 RX ADMIN — MORPHINE SULFATE 2 MILLIGRAM(S): 50 CAPSULE, EXTENDED RELEASE ORAL at 14:26

## 2017-08-17 RX ADMIN — MORPHINE SULFATE 2 MILLIGRAM(S): 50 CAPSULE, EXTENDED RELEASE ORAL at 23:30

## 2017-08-17 RX ADMIN — MORPHINE SULFATE 2 MILLIGRAM(S): 50 CAPSULE, EXTENDED RELEASE ORAL at 07:04

## 2017-08-17 RX ADMIN — MORPHINE SULFATE 2 MILLIGRAM(S): 50 CAPSULE, EXTENDED RELEASE ORAL at 03:28

## 2017-08-17 RX ADMIN — MORPHINE SULFATE 2 MILLIGRAM(S): 50 CAPSULE, EXTENDED RELEASE ORAL at 09:20

## 2017-08-17 RX ADMIN — MORPHINE SULFATE 2 MILLIGRAM(S): 50 CAPSULE, EXTENDED RELEASE ORAL at 11:36

## 2017-08-17 RX ADMIN — Medication 4 DROP(S): at 03:18

## 2017-08-17 RX ADMIN — MORPHINE SULFATE 2 MILLIGRAM(S): 50 CAPSULE, EXTENDED RELEASE ORAL at 17:26

## 2017-08-17 RX ADMIN — MORPHINE SULFATE 2 MILLIGRAM(S): 50 CAPSULE, EXTENDED RELEASE ORAL at 02:28

## 2017-08-17 RX ADMIN — MORPHINE SULFATE 2 MILLIGRAM(S): 50 CAPSULE, EXTENDED RELEASE ORAL at 09:19

## 2017-08-17 RX ADMIN — MORPHINE SULFATE 2 MILLIGRAM(S): 50 CAPSULE, EXTENDED RELEASE ORAL at 23:45

## 2017-08-17 RX ADMIN — MORPHINE SULFATE 2 MILLIGRAM(S): 50 CAPSULE, EXTENDED RELEASE ORAL at 19:53

## 2017-08-17 RX ADMIN — Medication 1 MILLIGRAM(S): at 23:42

## 2017-08-17 RX ADMIN — Medication 1 MILLIGRAM(S): at 07:36

## 2017-08-17 NOTE — PROGRESS NOTE ADULT - ATTENDING COMMENTS
pt is on comfort care  hospice eval ordered and pendining
pt is on comfort care  hospice eval ordered and pendining
pt is on comfort care  hospice eval ordered  spoke to daughter rodrigo fofana is his hcp, she will be coming back to [place] today

## 2017-08-17 NOTE — PROGRESS NOTE ADULT - PROBLEM SELECTOR PLAN 1
on palliative care per family and hcp  on 100% nrb mask  on comfort care now  hospice to evaluated pt today per family wishes
on palliative care per family and hcp  on 100% nrb mask  on comfort care now  palliative noted
patient appears comfortable  await hospice eval  continue morphine, ativan and atropine gtt prn
patient appears comfortable  await hospice eval  continue morphine, ativan and atropine gtt prn
unclear if secondary to UGIB vs ileus  (+) hgb decrease however no overt s/s of gi bleeding  patient to be comfort care measures only as per family  no endoscopic intervention planned at this time
unclear if secondary to UGIB vs ileus  (+) hgb decrease however no overt s/s of gi bleeding  patient to be comfort care measures only as per family  no endoscopic intervention planned at this time
vq neg  on 100% nrb mask  on comfort care now  palliative noted
unclear if secondary to UGIB vs ileus  (+) hgb decrease however no overt s/s of gi bleeding  patient to be comfort care measures only as per family  no endoscopic intervention planned at this time
patient appears comfortable  d/c IVF  [place]  if clinically stable tomorrow, may consider hospice eval  continue morphine, ativan and atropine gtt prn

## 2017-08-17 NOTE — PROGRESS NOTE ADULT - SUBJECTIVE AND OBJECTIVE BOX
Interval events: Pt remains comfort measures only    HPI:This is an 88 year old male from [facility] in the area, who presents to the emergency room after 1 episode of vomiting.  On arrival to the ER patient appears dyspneic with O2 sat of 94% on room air though patient denies any SOB. Admits to slight cough but No CP/AP/Back Pain. ABG showing hypoxia, to have VQ scan. As per daughter who is his hcp, pt has been deteriorating for the past one year. Lives at assisted living facility and has a 24 hr aid. Pt admits mild epigastric pain. Pt denies diarrhea , constipation, hematemesis, hematochezia.    MEDICATIONS  (STANDING):    MEDICATIONS  (PRN):  acetaminophen   Tablet 650 milliGRAM(s) Oral every 6 hours PRN Mild Pain  ondansetron Injectable 4 milliGRAM(s) IV Push every 6 hours PRN Nausea and/or Vomiting  morphine  - Injectable 2 milliGRAM(s) IV Push every 2 hours PRN pain/dyspnea/discomfort/breathlessness  LORazepam   Injectable 1 milliGRAM(s) IntraMuscular every 6 hours PRN anxiet/agitation  atropine 1% Ophthalmic Solution for SubLingual Use 4 Drop(s) SubLingual every 6 hours PRN terminal secretions  LORazepam   Injectable 1 milliGRAM(s) IV Push every 6 hours PRN agitation/anxiety      Allergies    No Known Allergies    Intolerances        Review of Systems:    General:  No wt loss, fevers, chills, night sweats, fatigue,   Eyes:  Good vision, no reported pain  ENT:  No sore throat, pain, runny nose, dysphagia  CV:  No pain, palpitations, hypo/hypertension  Resp:  No dyspnea, cough, tachypnea, wheezing  GI:  +nausea/vomiting, mild epigastric pain   :  No pain, bleeding, incontinence, nocturia  Muscle:  No pain, weakness  Neuro:  No weakness, tingling, memory problems  Psych:  No fatigue, insomnia, mood problems, depression  Endocrine:  No polyuria, polydipsia, cold/heat intolerance  Heme:  No petechiae, ecchymosis, easy bruisability  Skin:  No rash, tattoos, scars, edema        Vital Signs Last 24 Hrs  T(C): 37.4 (17 Aug 2017 06:30), Max: 37.4 (17 Aug 2017 06:30)  T(F): 99.3 (17 Aug 2017 06:30), Max: 99.3 (17 Aug 2017 06:30)  HR: --  BP: --  BP(mean): --  RR: --  SpO2: --    PHYSICAL EXAM:    GENERAL:  Appears stated age, well-groomed, well-nourished, no distress  HEENT:  NC/AT,  conjunctivae clear and pink, no thyromegaly, nodules, adenopathy, no JVD, sclera -anicteric  CHEST:  Full & symmetric excursion, no increased effort, breath sounds clear  HEART:  Regular rhythm, S1, S2, no murmur/rub/S3/S4, no abdominal bruit, no edema  ABDOMEN:  Soft, non-tender, + distened, normoactive bowel sounds,  no masses ,no hepato-splenomegaly, no signs of chronic liver disease  EXTREMITIES  no cyanosis,clubbing or edema  SKIN:  No rash/erythema/ecchymoses/petechiae/wounds/abscess/warm/dry  NEURO:  AAO x 1-2, no asterixis, no tremor, no encephalopathy          LABS:                RADIOLOGY & ADDITIONAL TESTS:

## 2017-08-17 NOTE — PROGRESS NOTE ADULT - SUBJECTIVE AND OBJECTIVE BOX
Patient is a 88y old  Male who presents with a chief complaint of vomitting and unresponsiveness (14 Aug 2017 13:13)      INTERVAL HPI/OVERNIGHT EVENTS:pt status unchanged, hospice to see pt today, still with resp distress on 100% o2    MEDICATIONS  (STANDING):    MEDICATIONS  (PRN):  acetaminophen   Tablet 650 milliGRAM(s) Oral every 6 hours PRN Mild Pain  ondansetron Injectable 4 milliGRAM(s) IV Push every 6 hours PRN Nausea and/or Vomiting  morphine  - Injectable 2 milliGRAM(s) IV Push every 2 hours PRN pain/dyspnea/discomfort/breathlessness  LORazepam   Injectable 1 milliGRAM(s) IntraMuscular every 6 hours PRN anxiet/agitation  atropine 1% Ophthalmic Solution for SubLingual Use 4 Drop(s) SubLingual every 6 hours PRN terminal secretions  LORazepam   Injectable 1 milliGRAM(s) IV Push every 6 hours PRN agitation/anxiety      Allergies    No Known Allergies    Intolerances        REVIEW OF SYSTEMS:  CONSTITUTIONAL: No fever, weight loss, or fatigue  EYES: No eye pain, visual disturbances  ENMT:  No difficulty hearing, tinnitus, vertigo; No sinus or throat pain  NECK: No pain or stiffness  RESPIRATORY: sob  CARDIOVASCULAR: No chest pain, palpitations, dizziness  GASTROINTESTINAL: No abdominal or epigastric pain. No nausea, vomiting, or hematemesis; No diarrhea or constipation. No melena or hematochezia.  GENITOURINARY: No dysuria, frequency, hematuria, or incontinence  NEUROLOGICAL: No headaches, memory loss, loss of strength, numbness, or tremors  SKIN: No itching, burning  LYMPH NODES: No enlarged glands  MUSCULOSKELETAL: No joint pain or swelling; No muscle, back, or extremity pain  PSYCHIATRIC: No depression, mood swings  HEME/LYMPH: No easy bruising, or bleeding gums  ALLERGY AND IMMUNOLOGIC: No hives    Vital Signs Last 24 Hrs  T(C): 37.4 (17 Aug 2017 06:30), Max: 37.4 (17 Aug 2017 06:30)  T(F): 99.3 (17 Aug 2017 06:30), Max: 99.3 (17 Aug 2017 06:30)  HR: --  BP: --  BP(mean): --  RR: --  SpO2: --    PHYSICAL EXAM:  GENERAL: NAD, well-groomed, well-developed  HEAD:  Atraumatic, Normocephalic  EYES: EOMI, PERRLA, conjunctiva and sclera clear  ENMT: No tonsillar erythema, exudates, or enlargement   NECK: Supple, No JVD  NERVOUS SYSTEM:  Alert and awake  CHEST/LUNG:b/l rhonci  HEART: Regular rate and rhythm  ABDOMEN: Soft, Nontender, Nondistended; Bowel sounds present  EXTREMITIES:  2+ Peripheral Pulses   LYMPH: No lymphadenopathy noted  SKIN: No rashes     LABS:              CAPILLARY BLOOD GLUCOSE                RADIOLOGY & ADDITIONAL TESTS:  Patient is a 88y old  Male who presents with a chief complaint of vomitting and unresponsiveness (14 Aug 2017 13:13)      INTERVAL HPI/OVERNIGHT EVENTS:    MEDICATIONS  (STANDING):    MEDICATIONS  (PRN):  acetaminophen   Tablet 650 milliGRAM(s) Oral every 6 hours PRN Mild Pain  ondansetron Injectable 4 milliGRAM(s) IV Push every 6 hours PRN Nausea and/or Vomiting  morphine  - Injectable 2 milliGRAM(s) IV Push every 2 hours PRN pain/dyspnea/discomfort/breathlessness  LORazepam   Injectable 1 milliGRAM(s) IntraMuscular every 6 hours PRN anxiet/agitation  atropine 1% Ophthalmic Solution for SubLingual Use 4 Drop(s) SubLingual every 6 hours PRN terminal secretions  LORazepam   Injectable 1 milliGRAM(s) IV Push every 6 hours PRN agitation/anxiety      Allergies    No Known Allergies    Intolerances        REVIEW OF SYSTEMS:  CONSTITUTIONAL: No fever, weight loss, or fatigue  EYES: No eye pain, visual disturbances  ENMT:  No difficulty hearing, tinnitus, vertigo; No sinus or throat pain  NECK: No pain or stiffness  RESPIRATORY: No cough, wheezing, chills or hemoptysis; No shortness of breath  CARDIOVASCULAR: No chest pain, palpitations, dizziness  GASTROINTESTINAL: No abdominal or epigastric pain. No nausea, vomiting, or hematemesis; No diarrhea or constipation. No melena or hematochezia.  GENITOURINARY: No dysuria, frequency, hematuria, or incontinence  NEUROLOGICAL: No headaches, memory loss, loss of strength, numbness, or tremors  SKIN: No itching, burning  LYMPH NODES: No enlarged glands  MUSCULOSKELETAL: No joint pain or swelling; No muscle, back, or extremity pain  PSYCHIATRIC: No depression, mood swings  HEME/LYMPH: No easy bruising, or bleeding gums  ALLERGY AND IMMUNOLOGIC: No hives    Vital Signs Last 24 Hrs  T(C): 37.4 (17 Aug 2017 06:30), Max: 37.4 (17 Aug 2017 06:30)  T(F): 99.3 (17 Aug 2017 06:30), Max: 99.3 (17 Aug 2017 06:30)  HR: --  BP: --  BP(mean): --  RR: --  SpO2: --    PHYSICAL EXAM:  GENERAL: NAD, well-groomed, well-developed  HEAD:  Atraumatic, Normocephalic  EYES: EOMI, PERRLA, conjunctiva and sclera clear  ENMT: No tonsillar erythema, exudates, or enlargement   NECK: Supple, No JVD  NERVOUS SYSTEM:  Alert & Oriented X3, Good concentration  CHEST/LUNG: Clear to auscultation bilaterally; No rales, rhonchi, wheezing  HEART: Regular rate and rhythm  ABDOMEN: Soft, Nontender, Nondistended; Bowel sounds present  EXTREMITIES:  2+ Peripheral Pulses   LYMPH: No lymphadenopathy noted  SKIN: No rashes     LABS:              CAPILLARY BLOOD GLUCOSE                RADIOLOGY & ADDITIONAL TESTS:  no new    Consultant(s) Notes Reviewed:  [ x] YES  [ ] NO    Care Discussed with Consultants/Other Providers [x ] YES  [ ] NO    Advanced Care Planning Discussed with Patien/Family [ x] YES  [ ] NO      Consultant(s) Notes Reviewed:  [ ] YES  [ ] NO    Care Discussed with Consultants/Other Providers [ ] YES  [ ] NO    Advanced Care Planning Discussed with Patien/Family [ ] YES  [ ] NO

## 2017-08-17 NOTE — PROGRESS NOTE ADULT - PROBLEM SELECTOR PROBLEM 1
Dyspnea, unspecified type
Goals of care, counseling/discussion
Nausea & vomiting

## 2017-08-18 RX ADMIN — MORPHINE SULFATE 2 MILLIGRAM(S): 50 CAPSULE, EXTENDED RELEASE ORAL at 01:32

## 2017-08-18 RX ADMIN — MORPHINE SULFATE 2 MILLIGRAM(S): 50 CAPSULE, EXTENDED RELEASE ORAL at 01:47

## 2017-08-18 NOTE — DISCHARGE NOTE FOR THE EXPIRED PATIENT - HOSPITAL COURSE
Admitted for vomiting and hyppoxia and generally not feeling well, per daughter, father's condition has been worsening, in hospital with respiratory failure requiring 100% o2, pt made palliative care and was placed on comfort care and ultimately  from resp failure.

## 2017-08-18 NOTE — CHART NOTE - NSCHARTNOTEFT_GEN_A_CORE
RN called resident for patient regarding unresponsiveness.  Patient was DNR.  Patient had no response to any stimuli including verbal or tactile stimuli.  No spontaneous movements were present.  No papillary or corneal reflexes were seen.  No breath sounds were auscultated and no spontaneous breaths were seen.  No hearth sounds were heard over the entire precordium and no pulses were palpated.  Patient was pronounced at 3:39 AM. Dr. ROSEMARIE Segal notified. Dr. ROSEMARIE Segal will call Amy ([phone]) daughter.

## 2017-08-19 LAB
CULTURE RESULTS: SIGNIFICANT CHANGE UP
SPECIMEN SOURCE: SIGNIFICANT CHANGE UP

## 2017-08-24 DIAGNOSIS — J96.01 ACUTE RESPIRATORY FAILURE WITH HYPOXIA: ICD-10-CM

## 2017-08-24 DIAGNOSIS — I46.9 CARDIAC ARREST, CAUSE UNSPECIFIED: ICD-10-CM

## 2017-08-24 DIAGNOSIS — J69.0 PNEUMONITIS DUE TO INHALATION OF FOOD AND VOMIT: ICD-10-CM

## 2017-08-24 DIAGNOSIS — Z95.5 PRESENCE OF CORONARY ANGIOPLASTY IMPLANT AND GRAFT: ICD-10-CM

## 2017-08-24 DIAGNOSIS — F32.9 MAJOR DEPRESSIVE DISORDER, SINGLE EPISODE, UNSPECIFIED: ICD-10-CM

## 2017-08-24 DIAGNOSIS — K21.9 GASTRO-ESOPHAGEAL REFLUX DISEASE WITHOUT ESOPHAGITIS: ICD-10-CM

## 2017-08-24 DIAGNOSIS — E86.0 DEHYDRATION: ICD-10-CM

## 2017-08-24 DIAGNOSIS — K56.7 ILEUS, UNSPECIFIED: ICD-10-CM

## 2017-08-24 DIAGNOSIS — Z51.5 ENCOUNTER FOR PALLIATIVE CARE: ICD-10-CM

## 2017-08-24 DIAGNOSIS — I25.10 ATHEROSCLEROTIC HEART DISEASE OF NATIVE CORONARY ARTERY WITHOUT ANGINA PECTORIS: ICD-10-CM

## 2017-08-24 DIAGNOSIS — R11.10 VOMITING, UNSPECIFIED: ICD-10-CM

## 2017-10-19 PROCEDURE — 85730 THROMBOPLASTIN TIME PARTIAL: CPT

## 2017-10-19 PROCEDURE — 85610 PROTHROMBIN TIME: CPT

## 2017-10-19 PROCEDURE — A9567: CPT

## 2017-10-19 PROCEDURE — 96375 TX/PRO/DX INJ NEW DRUG ADDON: CPT

## 2017-10-19 PROCEDURE — 93005 ELECTROCARDIOGRAM TRACING: CPT

## 2017-10-19 PROCEDURE — 96365 THER/PROPH/DIAG IV INF INIT: CPT

## 2017-10-19 PROCEDURE — 74020: CPT

## 2017-10-19 PROCEDURE — 71045 X-RAY EXAM CHEST 1 VIEW: CPT

## 2017-10-19 PROCEDURE — 78582 LUNG VENTILAT&PERFUS IMAGING: CPT

## 2017-10-19 PROCEDURE — 83880 ASSAY OF NATRIURETIC PEPTIDE: CPT

## 2017-10-19 PROCEDURE — 87150 DNA/RNA AMPLIFIED PROBE: CPT

## 2017-10-19 PROCEDURE — 99285 EMERGENCY DEPT VISIT HI MDM: CPT | Mod: 25

## 2017-10-19 PROCEDURE — 83605 ASSAY OF LACTIC ACID: CPT

## 2017-10-19 PROCEDURE — 84484 ASSAY OF TROPONIN QUANT: CPT

## 2017-10-19 PROCEDURE — 80048 BASIC METABOLIC PNL TOTAL CA: CPT

## 2017-10-19 PROCEDURE — 85027 COMPLETE CBC AUTOMATED: CPT

## 2017-10-19 PROCEDURE — 36600 WITHDRAWAL OF ARTERIAL BLOOD: CPT

## 2017-10-19 PROCEDURE — 87040 BLOOD CULTURE FOR BACTERIA: CPT

## 2017-10-19 PROCEDURE — 80053 COMPREHEN METABOLIC PANEL: CPT

## 2017-10-19 PROCEDURE — A9540: CPT

## 2017-10-19 PROCEDURE — 82803 BLOOD GASES ANY COMBINATION: CPT

## 2017-10-19 PROCEDURE — 36415 COLL VENOUS BLD VENIPUNCTURE: CPT

## 2020-09-23 NOTE — H&P ADULT - GEN GEN HX ROS MEA POS PC
fatigue/anorexia/weakness Advancement Flap (Double) Text: The defect edges were debeveled with a #15 scalpel blade.  Given the location of the defect and the proximity to free margins a double advancement flap was deemed most appropriate.  Using a sterile surgical marker, the appropriate advancement flaps were drawn incorporating the defect and placing the expected incisions within the relaxed skin tension lines where possible.    The area thus outlined was incised deep to adipose tissue with a #15 scalpel blade.  The skin margins were undermined to an appropriate distance in all directions utilizing iris scissors.

## 2024-08-25 NOTE — PROGRESS NOTE ADULT - PROBLEM/PLAN-4
S/p Fall   neg orthostatic vitals  PT eval     murmur  echo shows mid cavitary gradient and aortic calcification   cont BB    HTN  cont current meds     hyponatremia  ? polydipsia  plan as per renal     history of TIA  consider low dose asa  statin      DISPLAY PLAN FREE TEXT